# Patient Record
Sex: FEMALE | Race: BLACK OR AFRICAN AMERICAN | NOT HISPANIC OR LATINO | URBAN - METROPOLITAN AREA
[De-identification: names, ages, dates, MRNs, and addresses within clinical notes are randomized per-mention and may not be internally consistent; named-entity substitution may affect disease eponyms.]

---

## 2023-11-11 ENCOUNTER — HOSPITAL ENCOUNTER (OUTPATIENT)
Facility: HOSPITAL | Age: 63
Setting detail: OBSERVATION
Discharge: NURSING, REHAB OR LONG-TERM CARE | End: 2023-11-17
Attending: EMERGENCY MEDICINE | Admitting: NURSE PRACTITIONER
Payer: COMMERCIAL

## 2023-11-11 ENCOUNTER — APPOINTMENT (OUTPATIENT)
Dept: RADIOLOGY | Facility: HOSPITAL | Age: 63
End: 2023-11-11
Payer: COMMERCIAL

## 2023-11-11 DIAGNOSIS — S73.004A HIP DISLOCATION, RIGHT, INITIAL ENCOUNTER (MULTI): Primary | ICD-10-CM

## 2023-11-11 DIAGNOSIS — R45.1 AGITATION: ICD-10-CM

## 2023-11-11 LAB
ANION GAP SERPL CALC-SCNC: 11 MMOL/L (ref 10–20)
BASOPHILS # BLD AUTO: 0.02 X10*3/UL (ref 0–0.1)
BASOPHILS NFR BLD AUTO: 0.4 %
BUN SERPL-MCNC: 18 MG/DL (ref 6–23)
CALCIUM SERPL-MCNC: 9.1 MG/DL (ref 8.6–10.6)
CHLORIDE SERPL-SCNC: 112 MMOL/L (ref 98–107)
CO2 SERPL-SCNC: 26 MMOL/L (ref 21–32)
CREAT SERPL-MCNC: 0.93 MG/DL (ref 0.5–1.05)
EOSINOPHIL # BLD AUTO: 0.06 X10*3/UL (ref 0–0.7)
EOSINOPHIL NFR BLD AUTO: 1.3 %
ERYTHROCYTE [DISTWIDTH] IN BLOOD BY AUTOMATED COUNT: 13.6 % (ref 11.5–14.5)
GFR SERPL CREATININE-BSD FRML MDRD: 69 ML/MIN/1.73M*2
GLUCOSE SERPL-MCNC: 101 MG/DL (ref 74–99)
HCT VFR BLD AUTO: 34.6 % (ref 36–46)
HGB BLD-MCNC: 10.9 G/DL (ref 12–16)
IMM GRANULOCYTES # BLD AUTO: 0.02 X10*3/UL (ref 0–0.7)
IMM GRANULOCYTES NFR BLD AUTO: 0.4 % (ref 0–0.9)
LYMPHOCYTES # BLD AUTO: 1.24 X10*3/UL (ref 1.2–4.8)
LYMPHOCYTES NFR BLD AUTO: 26.3 %
MCH RBC QN AUTO: 27.9 PG (ref 26–34)
MCHC RBC AUTO-ENTMCNC: 31.5 G/DL (ref 32–36)
MCV RBC AUTO: 89 FL (ref 80–100)
MONOCYTES # BLD AUTO: 0.44 X10*3/UL (ref 0.1–1)
MONOCYTES NFR BLD AUTO: 9.3 %
NEUTROPHILS # BLD AUTO: 2.93 X10*3/UL (ref 1.2–7.7)
NEUTROPHILS NFR BLD AUTO: 62.3 %
NRBC BLD-RTO: 0 /100 WBCS (ref 0–0)
PLATELET # BLD AUTO: 352 X10*3/UL (ref 150–450)
POTASSIUM SERPL-SCNC: 4.1 MMOL/L (ref 3.5–5.3)
RBC # BLD AUTO: 3.9 X10*6/UL (ref 4–5.2)
SODIUM SERPL-SCNC: 145 MMOL/L (ref 136–145)
WBC # BLD AUTO: 4.7 X10*3/UL (ref 4.4–11.3)

## 2023-11-11 PROCEDURE — 2500000004 HC RX 250 GENERAL PHARMACY W/ HCPCS (ALT 636 FOR OP/ED): Mod: SE

## 2023-11-11 PROCEDURE — 85652 RBC SED RATE AUTOMATED: CPT

## 2023-11-11 PROCEDURE — 72170 X-RAY EXAM OF PELVIS: CPT | Mod: FY,FR

## 2023-11-11 PROCEDURE — 27265 TREAT HIP DISLOCATION: CPT

## 2023-11-11 PROCEDURE — 80048 BASIC METABOLIC PNL TOTAL CA: CPT | Performed by: EMERGENCY MEDICINE

## 2023-11-11 PROCEDURE — 99285 EMERGENCY DEPT VISIT HI MDM: CPT | Mod: 25 | Performed by: EMERGENCY MEDICINE

## 2023-11-11 PROCEDURE — 73502 X-RAY EXAM HIP UNI 2-3 VIEWS: CPT | Mod: RT,FR

## 2023-11-11 PROCEDURE — 85025 COMPLETE CBC W/AUTO DIFF WBC: CPT | Performed by: EMERGENCY MEDICINE

## 2023-11-11 PROCEDURE — 86140 C-REACTIVE PROTEIN: CPT

## 2023-11-11 PROCEDURE — 36415 COLL VENOUS BLD VENIPUNCTURE: CPT | Performed by: EMERGENCY MEDICINE

## 2023-11-11 PROCEDURE — 99152 MOD SED SAME PHYS/QHP 5/>YRS: CPT

## 2023-11-11 PROCEDURE — 72170 X-RAY EXAM OF PELVIS: CPT | Mod: FOREIGN READ | Performed by: RADIOLOGY

## 2023-11-11 PROCEDURE — 99156 MOD SED OTH PHYS/QHP 5/>YRS: CPT

## 2023-11-11 PROCEDURE — 73502 X-RAY EXAM HIP UNI 2-3 VIEWS: CPT | Mod: RIGHT SIDE | Performed by: RADIOLOGY

## 2023-11-11 PROCEDURE — 99285 EMERGENCY DEPT VISIT HI MDM: CPT | Mod: 25,27

## 2023-11-11 PROCEDURE — 99156 MOD SED OTH PHYS/QHP 5/>YRS: CPT | Performed by: EMERGENCY MEDICINE

## 2023-11-11 PROCEDURE — 99285 EMERGENCY DEPT VISIT HI MDM: CPT | Performed by: EMERGENCY MEDICINE

## 2023-11-11 PROCEDURE — 96374 THER/PROPH/DIAG INJ IV PUSH: CPT

## 2023-11-11 RX ORDER — HYDROCHLOROTHIAZIDE 12.5 MG/1
25 CAPSULE ORAL ONCE
Status: DISCONTINUED | OUTPATIENT
Start: 2023-11-11 | End: 2023-11-12

## 2023-11-11 RX ORDER — PROPOFOL 10 MG/ML
0.5 INJECTION, EMULSION INTRAVENOUS AS NEEDED
Status: DISCONTINUED | OUTPATIENT
Start: 2023-11-11 | End: 2023-11-12

## 2023-11-11 RX ORDER — PROPOFOL 10 MG/ML
INJECTION, EMULSION INTRAVENOUS CODE/TRAUMA/SEDATION MEDICATION
Status: COMPLETED | OUTPATIENT
Start: 2023-11-11 | End: 2023-11-11

## 2023-11-11 RX ORDER — MORPHINE SULFATE 4 MG/ML
4 INJECTION INTRAVENOUS ONCE
Status: COMPLETED | OUTPATIENT
Start: 2023-11-11 | End: 2023-11-11

## 2023-11-11 RX ORDER — AMLODIPINE BESYLATE 5 MG/1
5 TABLET ORAL ONCE
Status: COMPLETED | OUTPATIENT
Start: 2023-11-11 | End: 2023-11-12

## 2023-11-11 RX ORDER — PROPOFOL 10 MG/ML
1 INJECTION, EMULSION INTRAVENOUS ONCE
Status: DISCONTINUED | OUTPATIENT
Start: 2023-11-11 | End: 2023-11-12

## 2023-11-11 RX ORDER — PHENYLEPHRINE HCL IN 0.9% NACL 0.4MG/10ML
40 SYRINGE (ML) INTRAVENOUS ONCE AS NEEDED
Status: DISCONTINUED | OUTPATIENT
Start: 2023-11-11 | End: 2023-11-12

## 2023-11-11 RX ADMIN — PROPOFOL 25 MG: 10 INJECTION, EMULSION INTRAVENOUS at 23:47

## 2023-11-11 RX ADMIN — MORPHINE SULFATE 4 MG: 4 INJECTION INTRAVENOUS at 23:26

## 2023-11-11 RX ADMIN — PROPOFOL 50 MG: 10 INJECTION, EMULSION INTRAVENOUS at 23:45

## 2023-11-11 ASSESSMENT — PAIN DESCRIPTION - LOCATION
LOCATION: HIP
LOCATION: HIP

## 2023-11-11 ASSESSMENT — COLUMBIA-SUICIDE SEVERITY RATING SCALE - C-SSRS
2. HAVE YOU ACTUALLY HAD ANY THOUGHTS OF KILLING YOURSELF?: NO
1. IN THE PAST MONTH, HAVE YOU WISHED YOU WERE DEAD OR WISHED YOU COULD GO TO SLEEP AND NOT WAKE UP?: NO
6. HAVE YOU EVER DONE ANYTHING, STARTED TO DO ANYTHING, OR PREPARED TO DO ANYTHING TO END YOUR LIFE?: NO

## 2023-11-11 ASSESSMENT — PAIN - FUNCTIONAL ASSESSMENT
PAIN_FUNCTIONAL_ASSESSMENT: 0-10
PAIN_FUNCTIONAL_ASSESSMENT: 0-10

## 2023-11-11 ASSESSMENT — PAIN DESCRIPTION - DESCRIPTORS: DESCRIPTORS: SHARP;SHOOTING

## 2023-11-11 ASSESSMENT — PAIN SCALES - GENERAL
PAINLEVEL_OUTOF10: 10 - WORST POSSIBLE PAIN
PAINLEVEL_OUTOF10: 10 - WORST POSSIBLE PAIN

## 2023-11-11 ASSESSMENT — PAIN DESCRIPTION - PAIN TYPE: TYPE: ACUTE PAIN

## 2023-11-11 ASSESSMENT — PAIN DESCRIPTION - ORIENTATION: ORIENTATION: RIGHT

## 2023-11-12 ENCOUNTER — APPOINTMENT (OUTPATIENT)
Dept: RADIOLOGY | Facility: HOSPITAL | Age: 63
End: 2023-11-12
Payer: COMMERCIAL

## 2023-11-12 PROBLEM — S73.004A HIP DISLOCATION, RIGHT, INITIAL ENCOUNTER (MULTI): Status: ACTIVE | Noted: 2023-11-12

## 2023-11-12 LAB
CRP SERPL-MCNC: 0.74 MG/DL
ERYTHROCYTE [SEDIMENTATION RATE] IN BLOOD BY WESTERGREN METHOD: 62 MM/H (ref 0–30)
GLUCOSE BLD MANUAL STRIP-MCNC: 114 MG/DL (ref 74–99)

## 2023-11-12 PROCEDURE — 96372 THER/PROPH/DIAG INJ SC/IM: CPT | Performed by: NURSE PRACTITIONER

## 2023-11-12 PROCEDURE — G0378 HOSPITAL OBSERVATION PER HR: HCPCS

## 2023-11-12 PROCEDURE — 2500000004 HC RX 250 GENERAL PHARMACY W/ HCPCS (ALT 636 FOR OP/ED): Mod: SE

## 2023-11-12 PROCEDURE — 82947 ASSAY GLUCOSE BLOOD QUANT: CPT

## 2023-11-12 PROCEDURE — 2500000004 HC RX 250 GENERAL PHARMACY W/ HCPCS (ALT 636 FOR OP/ED): Mod: SE | Performed by: STUDENT IN AN ORGANIZED HEALTH CARE EDUCATION/TRAINING PROGRAM

## 2023-11-12 PROCEDURE — 2500000004 HC RX 250 GENERAL PHARMACY W/ HCPCS (ALT 636 FOR OP/ED): Mod: SE | Performed by: NURSE PRACTITIONER

## 2023-11-12 PROCEDURE — 2500000001 HC RX 250 WO HCPCS SELF ADMINISTERED DRUGS (ALT 637 FOR MEDICARE OP): Mod: SE

## 2023-11-12 PROCEDURE — 73502 X-RAY EXAM HIP UNI 2-3 VIEWS: CPT | Mod: 59,RT,FR

## 2023-11-12 PROCEDURE — 99223 1ST HOSP IP/OBS HIGH 75: CPT | Performed by: NURSE PRACTITIONER

## 2023-11-12 PROCEDURE — 96376 TX/PRO/DX INJ SAME DRUG ADON: CPT

## 2023-11-12 PROCEDURE — 96375 TX/PRO/DX INJ NEW DRUG ADDON: CPT

## 2023-11-12 PROCEDURE — 2500000004 HC RX 250 GENERAL PHARMACY W/ HCPCS (ALT 636 FOR OP/ED): Mod: SE | Performed by: EMERGENCY MEDICINE

## 2023-11-12 PROCEDURE — 2500000001 HC RX 250 WO HCPCS SELF ADMINISTERED DRUGS (ALT 637 FOR MEDICARE OP): Mod: SE | Performed by: STUDENT IN AN ORGANIZED HEALTH CARE EDUCATION/TRAINING PROGRAM

## 2023-11-12 PROCEDURE — 2500000001 HC RX 250 WO HCPCS SELF ADMINISTERED DRUGS (ALT 637 FOR MEDICARE OP): Mod: SE | Performed by: NURSE PRACTITIONER

## 2023-11-12 PROCEDURE — 2500000005 HC RX 250 GENERAL PHARMACY W/O HCPCS: Mod: SE

## 2023-11-12 RX ORDER — ALBUTEROL SULFATE 90 UG/1
2 AEROSOL, METERED RESPIRATORY (INHALATION) EVERY 6 HOURS PRN
COMMUNITY
Start: 2023-08-02 | End: 2024-08-01

## 2023-11-12 RX ORDER — HYDRALAZINE HYDROCHLORIDE 25 MG/1
25 TABLET, FILM COATED ORAL ONCE
Status: COMPLETED | OUTPATIENT
Start: 2023-11-12 | End: 2023-11-12

## 2023-11-12 RX ORDER — QUETIAPINE FUMARATE 25 MG/1
25 TABLET, FILM COATED ORAL 2 TIMES DAILY PRN
Status: DISCONTINUED | OUTPATIENT
Start: 2023-11-12 | End: 2023-11-17 | Stop reason: HOSPADM

## 2023-11-12 RX ORDER — CYCLOBENZAPRINE HCL 10 MG
10 TABLET ORAL 3 TIMES DAILY PRN
COMMUNITY
Start: 2023-10-11 | End: 2024-01-09

## 2023-11-12 RX ORDER — ACETAMINOPHEN 500 MG
10 TABLET ORAL NIGHTLY
Status: DISCONTINUED | OUTPATIENT
Start: 2023-11-12 | End: 2023-11-17 | Stop reason: HOSPADM

## 2023-11-12 RX ORDER — GABAPENTIN 300 MG/1
600 CAPSULE ORAL 3 TIMES DAILY
COMMUNITY
Start: 2023-02-23

## 2023-11-12 RX ORDER — LANOLIN ALCOHOL/MO/W.PET/CERES
100 CREAM (GRAM) TOPICAL DAILY
Status: DISCONTINUED | OUTPATIENT
Start: 2023-11-12 | End: 2023-11-17 | Stop reason: HOSPADM

## 2023-11-12 RX ORDER — OXYCODONE HYDROCHLORIDE 5 MG/1
5 TABLET ORAL EVERY 6 HOURS PRN
Status: DISCONTINUED | OUTPATIENT
Start: 2023-11-12 | End: 2023-11-14

## 2023-11-12 RX ORDER — LEVETIRACETAM 750 MG/1
750 TABLET ORAL 2 TIMES DAILY
COMMUNITY
Start: 2021-01-16

## 2023-11-12 RX ORDER — AMOXICILLIN 250 MG
2 CAPSULE ORAL 2 TIMES DAILY
Status: DISCONTINUED | OUTPATIENT
Start: 2023-11-12 | End: 2023-11-17 | Stop reason: HOSPADM

## 2023-11-12 RX ORDER — HYDRALAZINE HYDROCHLORIDE 20 MG/ML
5 INJECTION INTRAMUSCULAR; INTRAVENOUS ONCE
Status: COMPLETED | OUTPATIENT
Start: 2023-11-12 | End: 2023-11-12

## 2023-11-12 RX ORDER — CHOLECALCIFEROL (VITAMIN D3) 50 MCG
2000 TABLET ORAL
COMMUNITY
Start: 2023-08-02

## 2023-11-12 RX ORDER — HYDROCHLOROTHIAZIDE 25 MG/1
25 TABLET ORAL
COMMUNITY
Start: 2023-02-23

## 2023-11-12 RX ORDER — MORPHINE SULFATE 4 MG/ML
INJECTION INTRAVENOUS
Status: COMPLETED
Start: 2023-11-12 | End: 2023-11-12

## 2023-11-12 RX ORDER — LEVETIRACETAM 500 MG/1
750 TABLET ORAL 2 TIMES DAILY
Status: DISCONTINUED | OUTPATIENT
Start: 2023-11-12 | End: 2023-11-17 | Stop reason: HOSPADM

## 2023-11-12 RX ORDER — ENOXAPARIN SODIUM 100 MG/ML
40 INJECTION SUBCUTANEOUS EVERY 24 HOURS
Status: DISCONTINUED | OUTPATIENT
Start: 2023-11-12 | End: 2023-11-17 | Stop reason: HOSPADM

## 2023-11-12 RX ORDER — MORPHINE SULFATE 4 MG/ML
4 INJECTION INTRAVENOUS ONCE
Status: COMPLETED | OUTPATIENT
Start: 2023-11-12 | End: 2023-11-12

## 2023-11-12 RX ORDER — HYDROCHLOROTHIAZIDE 25 MG/1
25 TABLET ORAL ONCE
Status: COMPLETED | OUTPATIENT
Start: 2023-11-12 | End: 2023-11-12

## 2023-11-12 RX ORDER — CHOLECALCIFEROL (VITAMIN D3) 25 MCG
2000 TABLET ORAL DAILY
Status: DISCONTINUED | OUTPATIENT
Start: 2023-11-12 | End: 2023-11-17 | Stop reason: HOSPADM

## 2023-11-12 RX ORDER — LEVETIRACETAM 750 MG/1
1500 TABLET, EXTENDED RELEASE ORAL DAILY
Status: DISCONTINUED | OUTPATIENT
Start: 2023-11-12 | End: 2023-11-12

## 2023-11-12 RX ORDER — HYDROCHLOROTHIAZIDE 25 MG/1
25 TABLET ORAL DAILY
Status: DISCONTINUED | OUTPATIENT
Start: 2023-11-12 | End: 2023-11-17 | Stop reason: HOSPADM

## 2023-11-12 RX ORDER — LANOLIN ALCOHOL/MO/W.PET/CERES
100 CREAM (GRAM) TOPICAL DAILY
COMMUNITY
Start: 2023-08-02

## 2023-11-12 RX ORDER — GABAPENTIN 300 MG/1
600 CAPSULE ORAL 3 TIMES DAILY
Status: DISCONTINUED | OUTPATIENT
Start: 2023-11-12 | End: 2023-11-14

## 2023-11-12 RX ORDER — HYDRALAZINE HYDROCHLORIDE 25 MG/1
25 TABLET, FILM COATED ORAL EVERY 8 HOURS PRN
Status: DISCONTINUED | OUTPATIENT
Start: 2023-11-12 | End: 2023-11-17 | Stop reason: HOSPADM

## 2023-11-12 RX ORDER — ACETAMINOPHEN 325 MG/1
975 TABLET ORAL EVERY 8 HOURS
Status: DISCONTINUED | OUTPATIENT
Start: 2023-11-12 | End: 2023-11-17 | Stop reason: HOSPADM

## 2023-11-12 RX ORDER — PROPOFOL 10 MG/ML
75 INJECTION, EMULSION INTRAVENOUS ONCE
Status: COMPLETED | OUTPATIENT
Start: 2023-11-12 | End: 2023-11-12

## 2023-11-12 RX ORDER — LABETALOL HYDROCHLORIDE 5 MG/ML
20 INJECTION, SOLUTION INTRAVENOUS ONCE
Status: COMPLETED | OUTPATIENT
Start: 2023-11-12 | End: 2023-11-12

## 2023-11-12 RX ORDER — CYCLOBENZAPRINE HCL 10 MG
10 TABLET ORAL 3 TIMES DAILY PRN
Status: DISCONTINUED | OUTPATIENT
Start: 2023-11-12 | End: 2023-11-17 | Stop reason: HOSPADM

## 2023-11-12 RX ADMIN — MORPHINE SULFATE 4 MG: 4 INJECTION INTRAVENOUS at 14:12

## 2023-11-12 RX ADMIN — Medication 10 MG: at 23:41

## 2023-11-12 RX ADMIN — HYDRALAZINE HYDROCHLORIDE 5 MG: 20 INJECTION INTRAMUSCULAR; INTRAVENOUS at 23:36

## 2023-11-12 RX ADMIN — PROPOFOL 75 MG: 10 INJECTION, EMULSION INTRAVENOUS at 02:06

## 2023-11-12 RX ADMIN — Medication 10 MG: at 23:16

## 2023-11-12 RX ADMIN — AMLODIPINE BESYLATE 5 MG: 5 TABLET ORAL at 20:46

## 2023-11-12 RX ADMIN — LABETALOL HYDROCHLORIDE 20 MG: 5 INJECTION, SOLUTION INTRAVENOUS at 06:57

## 2023-11-12 RX ADMIN — ENOXAPARIN SODIUM 40 MG: 100 INJECTION SUBCUTANEOUS at 19:56

## 2023-11-12 RX ADMIN — HYDRALAZINE HYDROCHLORIDE 5 MG: 20 INJECTION, SOLUTION INTRAMUSCULAR; INTRAVENOUS at 21:54

## 2023-11-12 RX ADMIN — HYDROCHLOROTHIAZIDE 25 MG: 25 TABLET ORAL at 03:34

## 2023-11-12 RX ADMIN — HYDROCHLOROTHIAZIDE 25 MG: 25 TABLET ORAL at 19:57

## 2023-11-12 RX ADMIN — GABAPENTIN 600 MG: 300 CAPSULE ORAL at 23:42

## 2023-11-12 RX ADMIN — LEVETIRACETAM 1500 MG: 750 TABLET, FILM COATED, EXTENDED RELEASE ORAL at 16:47

## 2023-11-12 RX ADMIN — QUETIAPINE FUMARATE 25 MG: 25 TABLET ORAL at 23:41

## 2023-11-12 RX ADMIN — OXYCODONE HYDROCHLORIDE 5 MG: 5 TABLET ORAL at 19:57

## 2023-11-12 RX ADMIN — HYDRALAZINE HYDROCHLORIDE 25 MG: 25 TABLET, FILM COATED ORAL at 20:52

## 2023-11-12 SDOH — SOCIAL STABILITY: SOCIAL INSECURITY: HAVE YOU HAD THOUGHTS OF HARMING ANYONE ELSE?: YES

## 2023-11-12 SDOH — SOCIAL STABILITY: SOCIAL INSECURITY: ARE THERE ANY APPARENT SIGNS OF INJURIES/BEHAVIORS THAT COULD BE RELATED TO ABUSE/NEGLECT?: NO

## 2023-11-12 SDOH — SOCIAL STABILITY: SOCIAL INSECURITY: HAS ANYONE EVER THREATENED TO HURT YOUR FAMILY OR YOUR PETS?: NO

## 2023-11-12 SDOH — SOCIAL STABILITY: SOCIAL INSECURITY: DOES ANYONE TRY TO KEEP YOU FROM HAVING/CONTACTING OTHER FRIENDS OR DOING THINGS OUTSIDE YOUR HOME?: NO

## 2023-11-12 SDOH — SOCIAL STABILITY: SOCIAL INSECURITY: ABUSE: ADULT

## 2023-11-12 SDOH — SOCIAL STABILITY: SOCIAL INSECURITY: ARE YOU OR HAVE YOU BEEN THREATENED OR ABUSED PHYSICALLY, EMOTIONALLY, OR SEXUALLY BY ANYONE?: NO

## 2023-11-12 SDOH — SOCIAL STABILITY: SOCIAL INSECURITY: DO YOU FEEL UNSAFE GOING BACK TO THE PLACE WHERE YOU ARE LIVING?: NO

## 2023-11-12 SDOH — SOCIAL STABILITY: SOCIAL INSECURITY: DO YOU FEEL ANYONE HAS EXPLOITED OR TAKEN ADVANTAGE OF YOU FINANCIALLY OR OF YOUR PERSONAL PROPERTY?: NO

## 2023-11-12 ASSESSMENT — LIFESTYLE VARIABLES
EVER HAD A DRINK FIRST THING IN THE MORNING TO STEADY YOUR NERVES TO GET RID OF A HANGOVER: NO
HOW OFTEN DO YOU HAVE 6 OR MORE DRINKS ON ONE OCCASION: NEVER
AUDIT-C TOTAL SCORE: 0
SKIP TO QUESTIONS 9-10: 1
REASON UNABLE TO ASSESS: NO
HAVE PEOPLE ANNOYED YOU BY CRITICIZING YOUR DRINKING: NO
HOW MANY STANDARD DRINKS CONTAINING ALCOHOL DO YOU HAVE ON A TYPICAL DAY: PATIENT DOES NOT DRINK
HAVE YOU EVER FELT YOU SHOULD CUT DOWN ON YOUR DRINKING: NO
AUDIT-C TOTAL SCORE: 0
EVER FELT BAD OR GUILTY ABOUT YOUR DRINKING: NO
HOW OFTEN DO YOU HAVE A DRINK CONTAINING ALCOHOL: NEVER

## 2023-11-12 ASSESSMENT — ACTIVITIES OF DAILY LIVING (ADL)
GROOMING: INDEPENDENT
TOILETING: NEEDS ASSISTANCE
PATIENT'S MEMORY ADEQUATE TO SAFELY COMPLETE DAILY ACTIVITIES?: YES
WALKS IN HOME: NEEDS ASSISTANCE
HEARING - LEFT EAR: FUNCTIONAL
BATHING: NEEDS ASSISTANCE
DRESSING YOURSELF: NEEDS ASSISTANCE
JUDGMENT_ADEQUATE_SAFELY_COMPLETE_DAILY_ACTIVITIES: YES
FEEDING YOURSELF: INDEPENDENT
ADEQUATE_TO_COMPLETE_ADL: YES
HEARING - RIGHT EAR: FUNCTIONAL

## 2023-11-12 ASSESSMENT — ENCOUNTER SYMPTOMS
MUSCULOSKELETAL NEGATIVE: 1
CONSTITUTIONAL NEGATIVE: 1
EYES NEGATIVE: 1
RESPIRATORY NEGATIVE: 1
GASTROINTESTINAL NEGATIVE: 1
CARDIOVASCULAR NEGATIVE: 1
NEUROLOGICAL NEGATIVE: 1
WHEEZING: 0

## 2023-11-12 ASSESSMENT — COGNITIVE AND FUNCTIONAL STATUS - GENERAL
PERSONAL GROOMING: A LOT
STANDING UP FROM CHAIR USING ARMS: TOTAL
DRESSING REGULAR UPPER BODY CLOTHING: TOTAL
WALKING IN HOSPITAL ROOM: TOTAL
DAILY ACTIVITIY SCORE: 10
DRESSING REGULAR LOWER BODY CLOTHING: TOTAL
MOVING TO AND FROM BED TO CHAIR: A LITTLE
MOVING FROM LYING ON BACK TO SITTING ON SIDE OF FLAT BED WITH BEDRAILS: A LITTLE
TOILETING: TOTAL
HELP NEEDED FOR BATHING: TOTAL
PATIENT BASELINE BEDBOUND: NO
CLIMB 3 TO 5 STEPS WITH RAILING: TOTAL
MOBILITY SCORE: 12
TURNING FROM BACK TO SIDE WHILE IN FLAT BAD: A LITTLE

## 2023-11-12 ASSESSMENT — PATIENT HEALTH QUESTIONNAIRE - PHQ9
SUM OF ALL RESPONSES TO PHQ9 QUESTIONS 1 & 2: 0
2. FEELING DOWN, DEPRESSED OR HOPELESS: NOT AT ALL
1. LITTLE INTEREST OR PLEASURE IN DOING THINGS: NOT AT ALL

## 2023-11-12 ASSESSMENT — PAIN SCALES - GENERAL
PAINLEVEL_OUTOF10: 8
PAINLEVEL_OUTOF10: 0 - NO PAIN
PAINLEVEL_OUTOF10: 10 - WORST POSSIBLE PAIN

## 2023-11-12 ASSESSMENT — PAIN - FUNCTIONAL ASSESSMENT
PAIN_FUNCTIONAL_ASSESSMENT: 0-10
PAIN_FUNCTIONAL_ASSESSMENT: 0-10

## 2023-11-12 NOTE — CONSULTS
Orthopaedic Surgery Consult H&P      Requesting Provider / Service:  ED    CC: R prosthetic hip dislocation    HPI: 63 y.o. female (Epilepsy on meds, prior cocaine use, alcohol abuse, s/p b/l KAMILLE at [facility] in 2009 w/out records, prior frequent R KAMILLE dislocations last in 2021) presents with above after bending over in a chair. Pt reports never following up with a surgeon for her dislocations. Denies any N/T in RLE.     Patient ambulates without assistance at baseline.    PMH:  History reviewed. No pertinent past medical history.    History reviewed. No pertinent surgical history.    Social History     Socioeconomic History    Marital status: Single     Spouse name: Not on file    Number of children: Not on file    Years of education: Not on file    Highest education level: Not on file   Occupational History    Not on file   Tobacco Use    Smoking status: Not on file    Smokeless tobacco: Not on file   Substance and Sexual Activity    Alcohol use: Not on file    Drug use: Not on file    Sexual activity: Not on file   Other Topics Concern    Not on file   Social History Narrative    Not on file     Social Determinants of Health     Financial Resource Strain: Not on file   Food Insecurity: Not on file   Transportation Needs: Not on file   Physical Activity: Not on file   Stress: Not on file   Social Connections: Not on file   Intimate Partner Violence: Not on file   Housing Stability: Not on file       Allergies   Allergen Reactions    Hydromorphone Unknown    Meperidine Unknown     Uncertain of reaction.  Had it during surgery?    Tetracycline Unknown         Current Facility-Administered Medications:     amLODIPine (Norvasc) tablet 5 mg, 5 mg, oral, Once, Claude Harp MD    hydroCHLOROthiazide (Microzide) capsule 25 mg, 25 mg, oral, Once, Claude Harp MD    oxygen (O2) therapy, , inhalation, Continuous, Claude Harp MD  No current outpatient medications on file.    Family History: non-contributory      Review of  "Systems:   ROS  No pertinent symptoms related to systems other than those stated above      O:  @24HRVITALS@  No intake or output data in the 24 hours ending 11/12/23 0234    Physical Exam:   BP (!) 171/117   Pulse 55   Temp 36.9 °C (98.5 °F) (Temporal)   Resp 11   Ht 1.6 m (5' 3\")   Wt 52.6 kg (116 lb)   SpO2 100%   BMI 20.55 kg/m²     Constitutional: NAD  HEENT: hearing and vision grossly intact, MMM  Resp: breathing comfortably on RA  CV: extremities warm, well perfused  Neuro: alert, sensory and motor grossly intact  Psych: Appropriate mood and affect    MSK:  Right Lower Extremity:   -Skin intact  - RLE shorter than LLE  -Tender at site of injury with painful ROM.  -Fires DF/PF/EHL/FHL  -SILT in saph/sural/SPN/DPN distributions  -Foot warm, well perfused  -Palpable DP pulse, brisk cap refill  -Compartments soft and compressible      Relevant Results  Results for orders placed or performed during the hospital encounter of 11/11/23 (from the past 24 hour(s))   CBC and Auto Differential   Result Value Ref Range    WBC 4.7 4.4 - 11.3 x10*3/uL    nRBC 0.0 0.0 - 0.0 /100 WBCs    RBC 3.90 (L) 4.00 - 5.20 x10*6/uL    Hemoglobin 10.9 (L) 12.0 - 16.0 g/dL    Hematocrit 34.6 (L) 36.0 - 46.0 %    MCV 89 80 - 100 fL    MCH 27.9 26.0 - 34.0 pg    MCHC 31.5 (L) 32.0 - 36.0 g/dL    RDW 13.6 11.5 - 14.5 %    Platelets 352 150 - 450 x10*3/uL    Neutrophils % 62.3 40.0 - 80.0 %    Immature Granulocytes %, Automated 0.4 0.0 - 0.9 %    Lymphocytes % 26.3 13.0 - 44.0 %    Monocytes % 9.3 2.0 - 10.0 %    Eosinophils % 1.3 0.0 - 6.0 %    Basophils % 0.4 0.0 - 2.0 %    Neutrophils Absolute 2.93 1.20 - 7.70 x10*3/uL    Immature Granulocytes Absolute, Automated 0.02 0.00 - 0.70 x10*3/uL    Lymphocytes Absolute 1.24 1.20 - 4.80 x10*3/uL    Monocytes Absolute 0.44 0.10 - 1.00 x10*3/uL    Eosinophils Absolute 0.06 0.00 - 0.70 x10*3/uL    Basophils Absolute 0.02 0.00 - 0.10 x10*3/uL   Basic metabolic panel   Result Value Ref Range    " Glucose 101 (H) 74 - 99 mg/dL    Sodium 145 136 - 145 mmol/L    Potassium 4.1 3.5 - 5.3 mmol/L    Chloride 112 (H) 98 - 107 mmol/L    Bicarbonate 26 21 - 32 mmol/L    Anion Gap 11 10 - 20 mmol/L    Urea Nitrogen 18 6 - 23 mg/dL    Creatinine 0.93 0.50 - 1.05 mg/dL    eGFR 69 >60 mL/min/1.73m*2    Calcium 9.1 8.6 - 10.6 mg/dL       XR hip right 2 or 3 views    Result Date: 11/12/2023  STUDY: Hip Radiographs; 11/12/2023 12:42 AM INDICATION: Post reduction. COMPARISON: 11/12/2023 XR Right Hip and Pelvis. ACCESSION NUMBER(S): [id number] ORDERING CLINICIAN: YUMI HARP TECHNIQUE:  Three view(s) of the right hip. FINDINGS:  The total hip arthroplasty is in anatomic alignment.  There is no displaced fracture.  There is no evidence of orthopedic hardware complications.  No soft tissue abnormality is seen.    No acute osseous abnormality identified.  No evidence of hardware failure or complication. Signed by Ashok Zacarias    XR pelvis 1-2 views    Result Date: 11/12/2023  STUDY: Pelvis Radiographs; 11/11/2023 11:56 PM INDICATION: Dislocation. COMPARISON: 11/11/2023 XR Right Hip and Pelvis. ACCESSION NUMBER(S): [id number] ORDERING CLINICIAN: OLIVIA BARRIENTOS TECHNIQUE:  One view(s) of the pelvis. FINDINGS:  The pelvic ring is intact.  There is no acute fracture.  Bilateral total hip arthroplasties are noted with normal alignment.    No definite acute pelvic pathology identified.  Normal alignment. Signed by Ashok Zacarias    XR hip right 2 or 3 views    Result Date: 11/11/2023  STUDY: Pelvis and Right Hip Radiographs; 11/11/2023 10:11PM INDICATION: Concern for dislocation. Evaluate for fracture. COMPARISON: 6/1/2023 X Hp Right. ACCESSION NUMBER(S): [id number] ORDERING CLINICIAN: Yumi Harp TECHNIQUE:  AP view of the pelvis and two view(s) of the right hip. FINDINGS:  PELVIS: No displaced fracture is evident. RIGHT HIP: Superior dislocation of the right femoral head component relative to the acetabular cup.  No  periprosthetic fracture is evident.    Right hip arthroplasty dislocation without periprosthetic fracture. Signed by Channing Springer DO         A/P: 63 y.o. female w/ prior frequent R prosthetic hip dislocations present with recurrent R prosthetic hip dislocation. Closed, NVI.  CR under conscious sedation, placed in a well padded knee immobilizer. Patient will ultimately need R KAMILLE revision with total joints team.     - No acute orthopedic intervention  - Recommend CDU admit overnight for eval by orthotics in AM for hip abduction brace  - WB: WBAT RLE in KI w/ posterior hip precautions until placed in hip abduction brace    - Dispo: Pending brace fitting in AM with orthotics, final follow up TBD w/ ortho joints for discussion of revision KAMILLE    To be discussed with attending on call, Dr. Ross.    Please do not hesitate to page with additional questions or concerns.    ------------------------------------------------------    Vinh Garcia MD  Orthopaedic Surgery, PGY-2  Available by Epic Chat  11/12/23  2:34 AM    This patient will be followed by Ortho Trauma team (All chat preferred):    1st call: Milind Luna, PGY-2  2nd call: Royal Vasquez, PGY-3    On weekends and after 6PM:  At [facility]: Please reach out to the orthopaedic on-call resident ([phone])  At [facility]: Please reach out to the orthopaedic on-call ARLEN or resident (please refer to Jovan)    After 5 pm and on weekends, please page the on-call resident ([phone]) with questions or concerns.      11/12/23  This consult was seen and staffed within 30 minutes of the initial consult.

## 2023-11-12 NOTE — ED TRIAGE NOTES
Patient brought in by EMS after she was walking in her home and felt her R hip pop out of place. Patient has a hx of seizures which she takes keppra for. Patients house was also found to have bed bugs.

## 2023-11-12 NOTE — PROGRESS NOTES
I assumed care of this patient at 700 hours.    Please see initial provider note for full HPI.  Briefly this is a 63-year-old female with past medical history of seizure, hypertension, crack cocaine and alcohol use disorder, asthma, migraines presenting due to right hip dislocation.  Patient had a prosthetic hip that was replaced by Ortho.  Orthotics was consulted to put the patient in a right hip abduction brace.  Patient will need eventual revision of the right hip.  Patient was told to not flex past 90 degrees and is unable to perform ADLs given posterior hip precautions.  For this reason patient was admitted to us for PT, OT and placement at SNF facility until she is able to get definitive management.  Patient care was overseen by attending physician agrees with the plan disposition.    Sue Gaitan MD  Emergency Medicine - PGY2  [facility]  [address] [facility] [id number]

## 2023-11-12 NOTE — H&P
"HPI   Ms. Pickering is a 63-year-old female with PMHx HTN, seizures, cocaine abuse, asthma, migraines, hip dislocation frequent, chronic hip pain patient presents today to the ED for complaint of right hip dislocation denies any falls or injuries.  Ortho was down to see patient in the ED and ordered an orthotic hip brace for her patient is currently wearing brace on right hip.  They also reduced it in the ED patient to be admitted for PT OT eval and treat and possible SNF placement.     Past Medical History  Past Medical History:   Diagnosis Date    Arthritis     Asthma     Chronic hip pain     Cocaine abuse (CMS/HCC)     Hypertension     Migraine     Seizure (CMS/HCC)        Surgical History  Past Surgical History:   Procedure Laterality Date    JOINT REPLACEMENT          Social History  She reports that she has quit smoking. Her smoking use included cigarettes. She has a 7.50 pack-year smoking history. She has never used smokeless tobacco. She reports current drug use. Drugs: \"Crack\" cocaine, Cocaine, and Marijuana. She reports that she does not drink alcohol.    Family History  Family History   Problem Relation Name Age of Onset    Hypertension Mother          Allergies  Hydromorphone, Meperidine, and Tetracycline  ROS/EXAM   Review of Systems   Constitutional: Negative.    HENT: Negative.     Eyes: Negative.    Respiratory: Negative.  Negative for wheezing.    Cardiovascular: Negative.    Gastrointestinal: Negative.    Genitourinary: Negative.    Musculoskeletal: Negative.    Skin: Negative.    Neurological: Negative.    All other systems reviewed and are negative.       Physical Exam  Vitals reviewed.   Constitutional:       Comments: Thin frail   HENT:      Head: Normocephalic.      Nose: Nose normal.      Mouth/Throat:      Mouth: Mucous membranes are moist.      Pharynx: Oropharynx is clear.   Eyes:      Conjunctiva/sclera: Conjunctivae normal.      Pupils: Pupils are equal, round, and reactive to light. " "  Cardiovascular:      Rate and Rhythm: Normal rate and regular rhythm.      Pulses: Normal pulses.      Heart sounds: Normal heart sounds.   Pulmonary:      Effort: Pulmonary effort is normal.      Breath sounds: Normal breath sounds.   Abdominal:      General: Abdomen is flat. Bowel sounds are normal.      Palpations: Abdomen is soft.   Musculoskeletal:         General: Tenderness present.      Cervical back: Normal range of motion.      Comments: Right hip brace in place   Skin:     General: Skin is warm and dry.      Capillary Refill: Capillary refill takes less than 2 seconds.   Neurological:      Mental Status: She is alert and oriented to person, place, and time. Mental status is at baseline.   Psychiatric:         Mood and Affect: Mood normal.         Behavior: Behavior normal.         Thought Content: Thought content normal.         Judgment: Judgment normal.       Vitals/LABS/RESULTS   Last Recorded Vitals  Blood pressure (!) 205/114, pulse 70, temperature 36.3 °C (97.3 °F), resp. rate 20, height 1.6 m (5' 3\"), weight 52.6 kg (116 lb), SpO2 97 %.  Intake/Output last 3 Shifts:  No intake/output data recorded.    Relevant Results  Lab Results   Component Value Date    WBC 4.7 11/11/2023    HGB 10.9 (L) 11/11/2023    HCT 34.6 (L) 11/11/2023    MCV 89 11/11/2023     11/11/2023      Lab Results   Component Value Date    GLUCOSE 101 (H) 11/11/2023    CALCIUM 9.1 11/11/2023     11/11/2023    K 4.1 11/11/2023    CO2 26 11/11/2023     (H) 11/11/2023    BUN 18 11/11/2023    CREATININE 0.93 11/11/2023     XR hip right 2 or 3 views    Result Date: 11/12/2023  STUDY: Hip Radiographs; 11/12/2023 12:42 AM INDICATION: Post reduction. COMPARISON: 11/12/2023 XR Right Hip and Pelvis. ACCESSION NUMBER(S): [id number] ORDERING CLINICIAN: YUMI TAVERAS TECHNIQUE:  Three view(s) of the right hip. FINDINGS:  The total hip arthroplasty is in anatomic alignment.  There is no displaced fracture.  There is no " evidence of orthopedic hardware complications.  No soft tissue abnormality is seen.    No acute osseous abnormality identified.  No evidence of hardware failure or complication. Signed by Ashok Zacarias    XR pelvis 1-2 views    Result Date: 11/12/2023  STUDY: Pelvis Radiographs; 11/11/2023 11:56 PM INDICATION: Dislocation. COMPARISON: 11/11/2023 XR Right Hip and Pelvis. ACCESSION NUMBER(S): [id number] ORDERING CLINICIAN: OLIVIA BARRIENTOS TECHNIQUE:  One view(s) of the pelvis. FINDINGS:  The pelvic ring is intact.  There is no acute fracture.  Bilateral total hip arthroplasties are noted with normal alignment.    No definite acute pelvic pathology identified.  Normal alignment. Signed by Ashok Zacarias    XR hip right 2 or 3 views    Result Date: 11/11/2023  STUDY: Pelvis and Right Hip Radiographs; 11/11/2023 10:11PM INDICATION: Concern for dislocation. Evaluate for fracture. COMPARISON: 6/1/2023 X Hp Right. ACCESSION NUMBER(S): [id number] ORDERING CLINICIAN: Claude Harp TECHNIQUE:  AP view of the pelvis and two view(s) of the right hip. FINDINGS:  PELVIS: No displaced fracture is evident. RIGHT HIP: Superior dislocation of the right femoral head component relative to the acetabular cup.  No periprosthetic fracture is evident.    Right hip arthroplasty dislocation without periprosthetic fracture. Signed by Channing Springer DO  Medications   Scheduled medications  acetaminophen, 975 mg, oral, q8h  amLODIPine, 5 mg, oral, Once  cholecalciferol, 2,000 Units, oral, Daily  enoxaparin, 40 mg, subcutaneous, q24h  gabapentin, 600 mg, oral, TID  hydroCHLOROthiazide, 25 mg, oral, Daily  levETIRAcetam, 750 mg, oral, BID  sennosides-docusate sodium, 2 tablet, oral, BID  thiamine, 100 mg, oral, Daily      Continuous medications  oxygen, , Last Rate: Stopped (11/12/23 1200)      PRN medications  PRN medications: cyclobenzaprine, oxyCODONE    PLAN   Ms. Pickering is a 63-year-old female with PMHx HTN, seizures, cocaine abuse, asthma,  migraines, hip dislocation frequent, chronic hip pain patient presents today to the ED for complaint of right hip dislocation denies any falls or injuries.  Ortho was down to see patient in the ED and ordered an orthotic hip brace for her patient is currently wearing brace on right hip.  They also reduced it in the ED patient to be admitted for PT OT eval and treat and possible SNF placement.  Assessment/Plan    Right hip dislocation  Chronic hip pain  -Ortho consult and appreciate recs  -Right hip orthotic brace per Ortho  -PT OT eval and treat  -Pain acetaminophen scheduled and as needed oxy  -Flexeril as needed  -Continue home gabapentin 600 3 times daily    HTN  -Continue home hydrochlorothiazide 25 mg daily    Seizures  -New home Keppra 750 mg twice daily    Fluids: monitor and replete as needed  Electrolytes: monitor and replete as needed  Nutrition: Regular diet   GI prophylaxis: as needed  DVT prophylaxis: SCD  Code Status: full Code      Disposition:  Admitted for above diagnoses. Plan per above. Anticipate observation stay      Akanksha Lea, APRN-CNP        Assessment/Plan   Principal Problem:    Hip dislocation, right, initial encounter (CMS/[facility])      I spent 75 minutes in the professional and overall care of this patient.

## 2023-11-12 NOTE — SIGNIFICANT EVENT
Ortho Care Plan:    Paged orthotics for a R hip abduction brace prior to DC. After final discussion with attending, plan for patient to follow up Faith for discussion of revision R KAMILLE. If unable to schedule/follow-up at [facility],  [facility] available for outpatient consultation.     D/w attending, Dr. Ross.    Vinh Garcia MD  Orthopaedic Surgery, PGY-2  Available by Epic Chat  11/12/23  7:36 AM

## 2023-11-13 PROCEDURE — G0378 HOSPITAL OBSERVATION PER HR: HCPCS

## 2023-11-13 PROCEDURE — 2500000001 HC RX 250 WO HCPCS SELF ADMINISTERED DRUGS (ALT 637 FOR MEDICARE OP): Mod: SE | Performed by: STUDENT IN AN ORGANIZED HEALTH CARE EDUCATION/TRAINING PROGRAM

## 2023-11-13 PROCEDURE — 2500000001 HC RX 250 WO HCPCS SELF ADMINISTERED DRUGS (ALT 637 FOR MEDICARE OP): Mod: SE | Performed by: NURSE PRACTITIONER

## 2023-11-13 PROCEDURE — 2500000004 HC RX 250 GENERAL PHARMACY W/ HCPCS (ALT 636 FOR OP/ED): Mod: SE | Performed by: NURSE PRACTITIONER

## 2023-11-13 PROCEDURE — 97167 OT EVAL HIGH COMPLEX 60 MIN: CPT | Mod: GO | Performed by: OCCUPATIONAL THERAPIST

## 2023-11-13 PROCEDURE — 97161 PT EVAL LOW COMPLEX 20 MIN: CPT | Mod: GP

## 2023-11-13 PROCEDURE — 99233 SBSQ HOSP IP/OBS HIGH 50: CPT | Performed by: STUDENT IN AN ORGANIZED HEALTH CARE EDUCATION/TRAINING PROGRAM

## 2023-11-13 PROCEDURE — 96372 THER/PROPH/DIAG INJ SC/IM: CPT | Performed by: NURSE PRACTITIONER

## 2023-11-13 RX ADMIN — SENNOSIDES AND DOCUSATE SODIUM 2 TABLET: 8.6; 5 TABLET ORAL at 09:31

## 2023-11-13 RX ADMIN — LEVETIRACETAM 750 MG: 500 TABLET, FILM COATED ORAL at 09:31

## 2023-11-13 RX ADMIN — ACETAMINOPHEN 975 MG: 325 TABLET ORAL at 09:31

## 2023-11-13 RX ADMIN — THIAMINE HCL TAB 100 MG 100 MG: 100 TAB at 09:32

## 2023-11-13 RX ADMIN — GABAPENTIN 600 MG: 300 CAPSULE ORAL at 16:56

## 2023-11-13 RX ADMIN — GABAPENTIN 600 MG: 300 CAPSULE ORAL at 09:32

## 2023-11-13 RX ADMIN — HYDROCHLOROTHIAZIDE 25 MG: 25 TABLET ORAL at 09:32

## 2023-11-13 RX ADMIN — ACETAMINOPHEN 975 MG: 325 TABLET ORAL at 16:55

## 2023-11-13 RX ADMIN — CYCLOBENZAPRINE 10 MG: 10 TABLET, FILM COATED ORAL at 20:04

## 2023-11-13 RX ADMIN — LEVETIRACETAM 750 MG: 500 TABLET, FILM COATED ORAL at 20:03

## 2023-11-13 RX ADMIN — Medication 2000 UNITS: at 09:32

## 2023-11-13 RX ADMIN — ENOXAPARIN SODIUM 40 MG: 100 INJECTION SUBCUTANEOUS at 16:56

## 2023-11-13 RX ADMIN — OXYCODONE HYDROCHLORIDE 5 MG: 5 TABLET ORAL at 20:04

## 2023-11-13 RX ADMIN — Medication 10 MG: at 20:03

## 2023-11-13 RX ADMIN — GABAPENTIN 600 MG: 300 CAPSULE ORAL at 20:03

## 2023-11-13 RX ADMIN — SENNOSIDES AND DOCUSATE SODIUM 2 TABLET: 8.6; 5 TABLET ORAL at 20:06

## 2023-11-13 ASSESSMENT — COGNITIVE AND FUNCTIONAL STATUS - GENERAL
MOBILITY SCORE: 17
HELP NEEDED FOR BATHING: A LITTLE
DAILY ACTIVITIY SCORE: 22
TURNING FROM BACK TO SIDE WHILE IN FLAT BAD: A LOT
DRESSING REGULAR LOWER BODY CLOTHING: A LOT
MOVING FROM LYING ON BACK TO SITTING ON SIDE OF FLAT BED WITH BEDRAILS: A LOT
WALKING IN HOSPITAL ROOM: A LOT
TURNING FROM BACK TO SIDE WHILE IN FLAT BAD: A LITTLE
MOVING TO AND FROM BED TO CHAIR: A LOT
CLIMB 3 TO 5 STEPS WITH RAILING: A LOT
PERSONAL GROOMING: A LOT
TOILETING: A LITTLE
HELP NEEDED FOR BATHING: A LOT
WALKING IN HOSPITAL ROOM: TOTAL
MOVING TO AND FROM BED TO CHAIR: A LITTLE
TOILETING: TOTAL
STANDING UP FROM CHAIR USING ARMS: A LOT
STANDING UP FROM CHAIR USING ARMS: A LITTLE
DAILY ACTIVITIY SCORE: 13
CLIMB 3 TO 5 STEPS WITH RAILING: TOTAL
MOBILITY SCORE: 10
DRESSING REGULAR UPPER BODY CLOTHING: A LOT

## 2023-11-13 ASSESSMENT — PAIN - FUNCTIONAL ASSESSMENT
PAIN_FUNCTIONAL_ASSESSMENT: 0-10

## 2023-11-13 ASSESSMENT — PAIN SCALES - GENERAL
PAINLEVEL_OUTOF10: 6
PAINLEVEL_OUTOF10: 0 - NO PAIN
PAINLEVEL_OUTOF10: 8

## 2023-11-13 ASSESSMENT — ACTIVITIES OF DAILY LIVING (ADL)
ADL_ASSISTANCE: INDEPENDENT
BATHING_ASSISTANCE: MAXIMAL
ADL_ASSISTANCE: INDEPENDENT
LACK_OF_TRANSPORTATION: NO
LACK_OF_TRANSPORTATION: YES

## 2023-11-13 ASSESSMENT — PAIN DESCRIPTION - ORIENTATION: ORIENTATION: RIGHT

## 2023-11-13 ASSESSMENT — PAIN DESCRIPTION - LOCATION: LOCATION: ARM

## 2023-11-13 NOTE — PROGRESS NOTES
Occupational Therapy    Evaluation    Patient Name: Sonido Pickering  MRN: [id number]  Today's Date: 11/13/2023  Time Calculation  Start Time: 1136  Stop Time: 1151  Time Calculation (min): 15 min    Assessment  IP OT Assessment  OT Assessment: Patient is a 63 year old female admitted to hospital [facility] right hip dislocation. She presents with impaired ADL, UE function, cognition, activity tolerance, and bed mobility. Anticipate impaired functional transfers and functional mobilty as well. Patient would benefit from skilled OT services while in hospital and post discharge.  Prognosis: Fair  Barriers to Discharge: Decreased caregiver support  Medical Staff Made Aware: Yes  End of Session Communication: PCT/NA/CTA, Bedside nurse  End of Session Patient Position: Bed, 3 rail up, Alarm on  Plan:  Treatment Interventions: ADL retraining, Functional transfer training, UE strengthening/ROM, Endurance training, Cognitive reorientation, Patient/family training, Equipment evaluation/education  OT Frequency: 3 times per week  OT Discharge Recommendations: Moderate intensity level of continued care    Subjective   Current Problem:  1. Hip dislocation, right, initial encounter (CMS/[facility])  Intubation    Moderate Sedation        General:  Reason for Referral: hip dislocation  Past Medical History Relevant to Rehab: 63-year-old female with PMHx HTN, seizures, cocaine abuse, asthma, migraines, hip dislocation frequent, chronic hip pain, chronic right shoulder pain  Patient Position Received: Bed, 3 rail up, Alarm on   Precautions:  Hearing/Visual Limitations: WFL  LE Weight Bearing Status: Weight Bearing as Tolerated  Braces Applied: Hip abduction brace in place on OT arrival.  Precautions Comment: Posterior hip precautions       Pain:  Pain Assessment  Pain Assessment: 0-10  Pain Score:  (Patient unable to provide pain number. She reports pain in her right shoulder indicating rotator cuff injury in past, and in her back.  Notified  CTA and nurse.)           Objective   Cognition:  Orientation Level:  (Patient oriented to day, month, year and grossly to hospital.)  Attention: Exceptions to WFL  Selective Attention: Impaired  Sustained Attention: Impaired    4AT  Alertness    Normal (fully alert, but not agitated, throughout assessment) 0          X Mild sleepiness for <10 seconds after waking, then normal 0  Clearly abnormal 4  Score: 0    AMT4  Age, date of birth, place (name of the hospital or building), current year  No mistakes 0  X 1 mistake 1 (grossly oriented to hospital, but not to exact hospital)  2 or more mistakes/untestable 2  Score: 1    Attention  Months of the year backwards Achieves   7 months or more correctly 0  X Starts but scores <7 months / refuses to start 1  Untestable (cannot start because unwell, drowsy, inattentive) 2  Score: 1    Acute change or fluctuating course  Evidence of significant change or fluctuation in: alertness, cognition, other mental function  (eg. paranoia, hallucinations) arising over the last 2 weeks and still evident in last 24hrs  X No 0  Yes 4  Score: 0    Total score: 2  4 or above: possible delirium +/- cognitive impairment  1-3: possible cognitive impairment  0: delirium or severe cognitive impairment unlikely (but  delirium still possible if [4] information incomplete)         Home Living:  Type of Home: Apartment  Home Adaptive Equipment: None   Prior Function:  Level of [place]: Independent with ADLs and functional transfers, Independent with homemaking with ambulation  ADL Assistance: Independent  Homemaking Assistance: Independent  Ambulatory Assistance: Independent  Hand Dominance: Left       ADL:  Grooming Assistance:  (set up for oral care, patient requries mod assist for hair)  Bathing Assistance: Maximal (lower body, SBA and set up upper body)  UE Dressing Assistance: Minimal (hospital gown, anticipate need for greater assist with tops/blouses)  LE Dressing Assistance:  Maximal  Toileting Assistance with Device: Unable to assess  Activity Tolerance:  Endurance:  (poor)  Bed Mobility/Transfers: Bed Mobility  Bed Mobility: Yes  Bed Mobility 1  Bed Mobility 1:  (Patient completed scooting in bed with SBA and verbal cues. She refused out of  bed activity.)   and Transfers  Transfer: No       Vision: Vision - Basic Assessment  Current Vision: Does not wear glasses   and    Sensation:  Light Touch:  (Patient denied numbness and tingling.)       Coordination:  Coordination Comment: WFL   Hand Function:     Extremities: RUE AROM (degrees)  RUE AROM Comment: right shoulder very minimal movement less than 10 degrees flexion, elbow grossly WFL active movement but patient resistive to movement due to pain in shoulder, wrist/digits WFL  RUE Strength  RUE Overall Strength:  (right shoulder 2-/5, elbow 2/5, wrist 3/5 based on observation), LUE AROM (degrees)  LUE AROM Comment: left shoulder approximately 45 degrees flexion, elbow/wrist WFL  LUE Strength  LUE Overall Strength:  (left shoulder 2/5, elbow and wrist 3/5 based on observation),  , and        Outcome Measures: [facility] Daily Activity  Putting on and taking off regular lower body clothing: A lot  Bathing (including washing, rinsing, drying): A lot  Putting on and taking off regular upper body clothing: A lot  Toileting, which includes using toilet, bedpan or urinal: Total  Taking care of personal grooming such as brushing teeth: A lot  Eating Meals: None  Daily Activity - Total Score: 13         ,     OT Adult Other Outcome Measures  4AT: 2 possible cognitive impairment    Education Documentation  Precautions, taught by Parisa Rebolledo OT at 11/13/2023 12:34 PM.  Learner: Patient  Readiness: Acceptance  Method: Explanation  Response: Needs Reinforcement  Comment: Patient instructed in role of OT and on hip precautions. Patient will require additional instruction.    Education Comments  No comments found.        Goals:   Encounter Problems          Add All  COGNITION/SAFETY  OT-Patient will recall and adhere to hip precautions during all functional mobility/ADL tasks in order to demonstrate improved understanding and promote healing post op    Today at 1234 - Progressing by Parisa Rebolledo, MATTEO    ADLs  OT-Patient will perform UB and LB bathing with minimal assist level of assistance and raised toilet seat and long-handled sponge.  Today at 1234 - Progressing by Parisa Rebolledo, OT    OT-Patient with complete upper body dressing with stand by assist level of assistance donning and doffing all UE clothes with no adaptive equipment while sitting in bedside chair.  Today at 1234 - Progressing by Parisa Rebolledo, OT    OT-Patient with complete lower body dressing with minimal assist level of assistance donning and doffing all LE clothes with reacher, shoe horn, and sock-aid while sitting in bedside chair.    Today at 1234 - Progressing by Parisa Rebolledo, OT    OT-Patient will complete daily grooming tasks with stand by assist level of assistance and PRN adaptive equipment while sitting in bedside chair.    Today at 1234 - Progressing by Parisa Rebolledo, OT    OT-Patient will complete toileting including hygiene clothing management/hygiene with minimal assist level of assistance and raised toilet seat and grab bars.    Today at 1234 - Progressing by Parisa Rebolledo, OT    TRANSFERS  OT-Patient will perform bed mobility minimal assist level of assistance and bed rails in order to improve safety and independence with mobility    Today at 1234 - Progressing by Parisa Rebolledo, OT    OT-Patient will complete functional transfer to all surfaces with front wheeled walker with minimal assist level of assistance.  Today at 1234 - Progressing by Parisa Rebolledo, OT    MOBILITY  OT-Patient will perform Functional mobility Household distances/ with minimal assist level of assistance and front wheeled walker in order to improve safety and functional mobility.    Today at 1234 -  Progressing by Parisa Rebolledo OT        11/13/23 at 12:38 PM   Parisa Rebolledo, OT   Rehab Office: [phone]

## 2023-11-13 NOTE — CARE PLAN
Problem: Fall/Injury  Goal: Not fall by end of shift  Outcome: Progressing  Goal: Be free from injury by end of the shift  Outcome: Progressing  Goal: Verbalize understanding of personal risk factors for fall in the hospital  Outcome: Progressing  Goal: Verbalize understanding of risk factor reduction measures to prevent injury from fall in the home  Outcome: Progressing  Goal: Use assistive devices by end of the shift  Outcome: Progressing  Goal: Pace activities to prevent fatigue by end of the shift  Outcome: Progressing     Problem: Pain  Goal: My pain/discomfort is manageable  Outcome: Progressing     Problem: Safety  Goal: Patient will be injury free during hospitalization  Outcome: Progressing  Goal: I will remain free of falls  Outcome: Progressing     Problem: Daily Care  Goal: Daily care needs are met  Outcome: Progressing     Problem: Psychosocial Needs  Goal: Demonstrates ability to cope with hospitalization/illness  Outcome: Progressing  Goal: Collaborate with me, my family, and caregiver to identify my specific goals  Outcome: Progressing     Problem: Discharge Barriers  Goal: My discharge needs are met  Outcome: Progressing     Problem: Skin  Goal: Decreased wound size/increased tissue granulation at next dressing change  Outcome: Progressing  Goal: Participates in plan/prevention/treatment measures  Outcome: Progressing  Goal: Prevent/manage excess moisture  Outcome: Progressing  Goal: Prevent/minimize sheer/friction injuries  Outcome: Progressing  Goal: Promote/optimize nutrition  Outcome: Progressing  Goal: Promote skin healing  Outcome: Progressing   The patient's goals for the shift include To decrease pain    The clinical goals for the shift include patient will remain without falls by the end of the shift

## 2023-11-13 NOTE — PROGRESS NOTES
11/13/23 1306   Discharge Planning   Living Arrangements Other (Comment)  (staying with god son)   Support Systems Family members  (Elizabeth Hillman (aunt) [phone])   Assistance Needed Independent   Type of Residence Other (Comment)  (Patient states she has been staying with different family members, no permanent residence)   Home or Post Acute Services Post acute facilities (Rehab/SNF/etc)   Type of Post Acute Facility Services Skilled nursing   Patient expects to be discharged to: new SNF   Does the patient need discharge transport arranged? Yes   RoundTrip coordination needed? Yes   Has discharge transport been arranged? No   Housing Stability   In the last 12 months, was there a time when you were not able to pay the mortgage or rent on time? N   In the last 12 months, was there a time when you did not have a steady place to sleep or slept in a shelter (including now)? N   Transportation Needs   In the past 12 months, has lack of transportation kept you from medical appointments or from getting medications? no   In the past 12 months, has lack of transportation kept you from meetings, work, or from getting things needed for daily living? No   Patient Choice   Provider Choice list and CMS website (https://medicare.gov/care-compare#search) for post-acute Quality and Resource Measure Data were provided and reviewed with: Patient     Transitional Care Coordination Progress Note:  Patient discussed during interdisciplinary rounds.   Team members present: IMANI GONSALEZ  Plan per Medical/Surgical team: Fall  Payor: [employer]  Discharge disposition: new SNF  Potential Barriers: none  ADOD: 1-2 days  Met with patient at bedside, provided introduction of self and role. Patient states she has been living with different family members, no permanent address. Patient states she is normally independent for adls, no assistive devices. Patient states she had a fall prior to admit; safe at home. Patient states she is not active  with a home health care agency. Patient states pcp Dr Jyoti Hough and she was there 2-3 weeks ago. Patient states she normally walks to appointments. Patient sates no concerns obtaining/affording medications. Patient refused CHW referral for housing/transportation resources. Discussed PT recommendations for moderate intensity therapy. Discussed differences between acute rehab, skilled nursing facility, home health care and outpatient therapy. Patient agreeable to skilled nursing facility. Educated regarding freedom of choice and provided financial disclosure. Facility list provided. Will continue to monitor for discharge planning needs.   Update 1545: Met with patient to discuss facility choices. Patient states she has not reviewed list and asked that this TCC follow up with her in the morning to give her time to review. Will follow up with patient in the morning. MD updated. Will continue to monitor for discharge planning needs.    Carolina KAT, RN  Transitional Care Coordinator (TCC)  [phone]

## 2023-11-13 NOTE — PROGRESS NOTES
"Physical Therapy    Physical Therapy Evaluation    Patient Name: Sonido Pickering  MRN: [id number]  Today's Date: 11/13/2023   Time Calculation  Start Time: 0830  Stop Time: 0845  Time Calculation (min): 15 min    Assessment/Plan   PT Assessment  PT Assessment Results: Decreased strength, Decreased endurance, Decreased range of motion, Impaired balance, Decreased mobility  Rehab Prognosis: Good  Barriers to Discharge: None  Evaluation/Treatment Tolerance: Patient limited by fatigue, Patient limited by pain  End of Session Communication: Bedside nurse  End of Session Patient Position: Bed, 3 rail up, Alarm on  IP OR SWING BED PT PLAN  Inpatient or Swing Bed: Inpatient  PT Plan  Treatment/Interventions: Bed mobility, Transfer training, Gait training, Balance training, Endurance training, Therapeutic activity, Therapeutic exercise, Range of motion  PT Plan: Skilled PT  PT Frequency: 3 times per week  PT Discharge Recommendations: Moderate intensity level of continued care  PT - OK to Discharge: Yes      Subjective   General Visit Information:  General  Reason for Referral: hip dislocation  Past Medical History Relevant to Rehab: 63-year-old female with PMHx HTN, seizures, cocaine abuse, asthma, migraines, hip dislocation frequent, chronic hip pain patient presents today to the ED for complaint of right hip dislocation. Ortho placed hip abduction brace and pt now to be WBAT w hip precautions.  Patient Position Received: Bed, 3 rail up, Alarm on  General Comment: Pt resting in bed upon entry. Initially reluctant to participate w PT as she noted she was tired. After much encouragement patient willing to participate w mobility.  Home Living:  Home Living  Type of Home: Apartment  Lives With:  (\"Friend\")  Home Adaptive Equipment: None  Home Living Comments: Pt reports no stairs.  Prior Level of Function:  Prior Function Per Pt/Caregiver Report  Level of [place]: Independent with ADLs and functional transfers (Pt " reports no needs at baseline. Up independently without device.)  Receives Help From:  (None)  ADL Assistance: Independent  Homemaking Assistance: Independent  Prior Function Comments: Pt reports no issues at baseline.  Precautions:  Precautions  LE Weight Bearing Status: Weight Bearing as Tolerated  Medical Precautions: Fall precautions  Braces Applied: Hip abduction brace already donned by ortho and on patient.  Precautions Comment: Posterior hip precautions  Vital Signs:       Objective   Pain:     Cognition:  Cognition  Overall Cognitive Status:  (Awake and alert though appearing restless. Following commands appropriately.)  Orientation Level: Disoriented to time  Impulsive: Mildly    General Assessments:  General Observation  General Observation: R hip abduction brace donned prior to PT visit and remained on patient.     Activity Tolerance  Endurance: Decreased tolerance for upright activites (Limited by fatigue and discomfort.)    Sensation  Light Touch: No apparent deficits    Strength  Strength Comments: RLE grossly 3/5, LLE 4/5 based on function.    Coordination  Movements are Fluid and Coordinated: Yes    Postural Control  Postural Control: Within Functional Limits    Static Sitting Balance  Static Sitting-Balance Support: Bilateral upper extremity supported  Static Sitting-Level of Assistance: Minimum assistance  Static Sitting-Comment/Number of Minutes: 5    Static Standing Balance  Static Standing-Balance Support:  (Pt unable to tolerate attempts to stand on this date.)  Functional Assessments:       Bed Mobility  Bed Mobility: Yes  Bed Mobility 1  Bed Mobility 1: Supine to sitting, Sitting to supine  Level of Assistance 1: Moderate assistance    Transfers  Transfer: No (Pt unable to tolerate attempts to stand due to fatigue and hip discomfort. Anticipate would require mod/max assist to perform.)    Ambulation/Gait Training  Ambulation/Gait Training Performed: No    Outcome Measures:  [facility] Basic  Mobility  Turning from your back to your side while in a flat bed without using bedrails: A lot  Moving from lying on your back to sitting on the side of a flat bed without using bedrails: A lot  Moving to and from bed to chair (including a wheelchair): A lot  Standing up from a chair using your arms (e.g. wheelchair or bedside chair): A lot  To walk in hospital room: Total  Climbing 3-5 steps with railing: Total  Basic Mobility - Total Score: 10    Encounter Problems       Encounter Problems (Active)       Mobility       STG - Patient will ambulate >15ft, wheeled walker, min assist       Start:  11/13/23    Expected End:  11/27/23               Safety       LTG - Patient will adhere to hip precautions during ADL's and transfers       Start:  11/13/23            LTG - Patient will demonstrate safety requirements appropriate to situation/environment       Start:  11/13/23            LTG - Patient will utilize safety techniques       Start:  11/13/23            STG - Patient uses call light consistently to request assistance with transfers       Start:  11/13/23               Transfers       STG - Patient to transfer to and from sit to supine min assist       Start:  11/13/23    Expected End:  11/27/23            STG - Patient will transfer sit to and from stand min assist       Start:  11/13/23    Expected End:  11/27/23                   Education Documentation  Mobility Training, taught by Don Ramsey PT at 11/13/2023  9:16 AM.  Learner: Patient  Readiness: Acceptance  Method: Explanation  Response: Verbalizes Understanding  Comment: Transfer training, discharge recs, use of call button.    Education Comments  No comments found.

## 2023-11-13 NOTE — CARE PLAN
Problem: Fall/Injury  Goal: Not fall by end of shift  Outcome: Progressing  Goal: Be free from injury by end of the shift  Outcome: Progressing  Goal: Verbalize understanding of personal risk factors for fall in the hospital  Outcome: Progressing  Goal: Verbalize understanding of risk factor reduction measures to prevent injury from fall in the home  Outcome: Progressing  Goal: Use assistive devices by end of the shift  Outcome: Progressing  Goal: Pace activities to prevent fatigue by end of the shift  Outcome: Progressing     Problem: Pain  Goal: My pain/discomfort is manageable  Outcome: Progressing     Problem: Safety  Goal: Patient will be injury free during hospitalization  Outcome: Progressing  Goal: I will remain free of falls  Outcome: Progressing     Problem: Daily Care  Goal: Daily care needs are met  Outcome: Progressing     Problem: Psychosocial Needs  Goal: Demonstrates ability to cope with hospitalization/illness  Outcome: Progressing  Goal: Collaborate with me, my family, and caregiver to identify my specific goals  Outcome: Progressing     Problem: Discharge Barriers  Goal: My discharge needs are met  Outcome: Progressing       The patient's goals for the shift include To decrease pain    The clinical goals for the shift include to keep pain level below 3

## 2023-11-13 NOTE — PROGRESS NOTES
"Sonido Pickering is a 63 y.o. female on hospital day 0 of admission presenting with Hip dislocation, right, initial encounter (CMS/[facility]).    Subjective     The patient states pain is well controlled.    Objective     GENERAL APPEARANCE: A&Ox3, appears in no acute distress  HEAD: normocephalic, atraumatic  THROAT: Oral cavity and pharynx normal. No inflammation, swelling, exudate, or lesions.  NECK: Neck supple, non-tender without lymphadenopathy, masses or thyromegaly.  CARDIAC: Normal S1 and S2. No S3, S4 or murmurs. Rhythm is regular. There is no peripheral edema, cyanosis or pallor. Extremities are warm and well perfused. No carotid bruits.  LUNGS: Clear to auscultation bilaterally without rales, rhonchi, wheezing or diminished breath sounds.  ABDOMEN: Positive bowel sounds. Soft, nondistended, nontender. No guarding or rebound. No masses.  EXTREMITIES: No significant deformity or joint abnormality. No edema. Peripheral pulses intact. No varicosities.  SKIN: Skin normal color, texture and turgor with no lesions or rash  PSYCHIATRIC: oriented to person, place, and time, good judgement and reason, without hallucinations, abnormal affect or abnormal behaviors during the examination. Patient is not suicidal.        Last Recorded Vitals  Blood pressure 149/89, pulse 72, temperature 36.8 °C (98.2 °F), resp. rate 16, height 1.6 m (5' 3\"), weight 52.6 kg (116 lb), SpO2 99 %.  Intake/Output last 3 Shifts:  I/O last 3 completed shifts:  In: - (0 mL/kg)   Out: 800 (15.2 mL/kg) [Urine:800 (0.4 mL/kg/hr)]  Weight: 52.6 kg     Relevant Results  Lab Results   Component Value Date    WBC 4.7 11/11/2023    HGB 10.9 (L) 11/11/2023    HCT 34.6 (L) 11/11/2023    MCV 89 11/11/2023     11/11/2023      Lab Results   Component Value Date    GLUCOSE 101 (H) 11/11/2023    CALCIUM 9.1 11/11/2023     11/11/2023    K 4.1 11/11/2023    CO2 26 11/11/2023     (H) 11/11/2023    BUN 18 11/11/2023    CREATININE 0.93 11/11/2023 "     Scheduled medications  acetaminophen, 975 mg, oral, q8h  cholecalciferol, 2,000 Units, oral, Daily  enoxaparin, 40 mg, subcutaneous, q24h  gabapentin, 600 mg, oral, TID  hydroCHLOROthiazide, 25 mg, oral, Daily  levETIRAcetam, 750 mg, oral, BID  melatonin, 10 mg, oral, Nightly  sennosides-docusate sodium, 2 tablet, oral, BID  thiamine, 100 mg, oral, Daily      Continuous medications  oxygen, , Last Rate: Stopped (11/12/23 1200)      PRN medications  PRN medications: cyclobenzaprine, hydrALAZINE, oxyCODONE, QUEtiapine    Assessment/Plan     Ms. Pickering is a 63-year-old female with PMHx HTN, seizures, cocaine abuse, asthma, migraines, hip dislocation frequent, chronic hip pain patient presents today to the ED for complaint of right hip dislocation denies any falls or injuries.  Ortho was down to see patient in the ED and ordered an orthotic hip brace for her patient is currently wearing brace on right hip.  They also reduced it in the ED patient to be admitted for PT OT eval and treat and possible SNF placement.  Assessment/Plan     Right hip dislocation  Chronic hip pain  -Ortho consult and appreciate recs  -Right hip orthotic brace per Ortho  -PT OT eval and treat, recommend moderate intensity therapy  -Pain acetaminophen scheduled and as needed oxy  -Flexeril as needed  -Continue home gabapentin 600 3 times daily     HTN  -Continue home hydrochlorothiazide 25 mg daily     Seizures  -New home Keppra 750 mg twice daily     Fluids: monitor and replete as needed  Electrolytes: monitor and replete as needed  Nutrition: Regular diet   GI prophylaxis: as needed  DVT prophylaxis: SCD  Code Status: full Code       Sudhakar Ye MD     The patient encounter includes all but not limited to; Evaluation of laboratory results, pertinent imaging, and vital signs. Daily updates are discussed with any consulting services and family/medical power of  as needed. The patient's discharge  process begins at admission and daily  contact with the patient's TCC and SW is pertinent in their efficient and safe discharge.

## 2023-11-13 NOTE — SIGNIFICANT EVENT
Rapid Response RN Note     Rapid Response called for HTN, pt's BP= 218/124; pt asymptomatic; NACR @ BS; PO hydralazine ordered/given; plan to recheck BP in 30-60mins & re-evaluate giving IV BP meds; pt to remain on HH3 @ this time; EKG done prior to mty arrival (SR); bedside RN encouraged to call with any further concerns

## 2023-11-14 LAB
ANION GAP SERPL CALC-SCNC: 15 MMOL/L (ref 10–20)
BUN SERPL-MCNC: 30 MG/DL (ref 6–23)
CALCIUM SERPL-MCNC: 8.6 MG/DL (ref 8.6–10.6)
CHLORIDE SERPL-SCNC: 98 MMOL/L (ref 98–107)
CO2 SERPL-SCNC: 25 MMOL/L (ref 21–32)
CREAT SERPL-MCNC: 1.55 MG/DL (ref 0.5–1.05)
ERYTHROCYTE [DISTWIDTH] IN BLOOD BY AUTOMATED COUNT: 13.1 % (ref 11.5–14.5)
GFR SERPL CREATININE-BSD FRML MDRD: 37 ML/MIN/1.73M*2
GLUCOSE SERPL-MCNC: 113 MG/DL (ref 74–99)
HCT VFR BLD AUTO: 32.5 % (ref 36–46)
HGB BLD-MCNC: 10.4 G/DL (ref 12–16)
MCH RBC QN AUTO: 28 PG (ref 26–34)
MCHC RBC AUTO-ENTMCNC: 32 G/DL (ref 32–36)
MCV RBC AUTO: 87 FL (ref 80–100)
NRBC BLD-RTO: 0 /100 WBCS (ref 0–0)
PLATELET # BLD AUTO: 319 X10*3/UL (ref 150–450)
POTASSIUM SERPL-SCNC: 3.7 MMOL/L (ref 3.5–5.3)
RBC # BLD AUTO: 3.72 X10*6/UL (ref 4–5.2)
SODIUM SERPL-SCNC: 134 MMOL/L (ref 136–145)
WBC # BLD AUTO: 6.2 X10*3/UL (ref 4.4–11.3)

## 2023-11-14 PROCEDURE — G0378 HOSPITAL OBSERVATION PER HR: HCPCS

## 2023-11-14 PROCEDURE — 36415 COLL VENOUS BLD VENIPUNCTURE: CPT | Performed by: STUDENT IN AN ORGANIZED HEALTH CARE EDUCATION/TRAINING PROGRAM

## 2023-11-14 PROCEDURE — 2500000001 HC RX 250 WO HCPCS SELF ADMINISTERED DRUGS (ALT 637 FOR MEDICARE OP): Mod: SE | Performed by: NURSE PRACTITIONER

## 2023-11-14 PROCEDURE — 80048 BASIC METABOLIC PNL TOTAL CA: CPT | Performed by: STUDENT IN AN ORGANIZED HEALTH CARE EDUCATION/TRAINING PROGRAM

## 2023-11-14 PROCEDURE — 99232 SBSQ HOSP IP/OBS MODERATE 35: CPT | Performed by: STUDENT IN AN ORGANIZED HEALTH CARE EDUCATION/TRAINING PROGRAM

## 2023-11-14 PROCEDURE — 85027 COMPLETE CBC AUTOMATED: CPT | Performed by: STUDENT IN AN ORGANIZED HEALTH CARE EDUCATION/TRAINING PROGRAM

## 2023-11-14 PROCEDURE — 2500000001 HC RX 250 WO HCPCS SELF ADMINISTERED DRUGS (ALT 637 FOR MEDICARE OP): Mod: SE | Performed by: STUDENT IN AN ORGANIZED HEALTH CARE EDUCATION/TRAINING PROGRAM

## 2023-11-14 RX ORDER — GABAPENTIN 300 MG/1
300 CAPSULE ORAL 3 TIMES DAILY
Status: DISCONTINUED | OUTPATIENT
Start: 2023-11-14 | End: 2023-11-17 | Stop reason: HOSPADM

## 2023-11-14 RX ORDER — OXYCODONE AND ACETAMINOPHEN 5; 325 MG/1; MG/1
1 TABLET ORAL EVERY 6 HOURS PRN
Status: DISCONTINUED | OUTPATIENT
Start: 2023-11-14 | End: 2023-11-17 | Stop reason: HOSPADM

## 2023-11-14 RX ADMIN — ACETAMINOPHEN 975 MG: 325 TABLET ORAL at 09:51

## 2023-11-14 RX ADMIN — ACETAMINOPHEN 975 MG: 325 TABLET ORAL at 00:57

## 2023-11-14 RX ADMIN — HYDROCHLOROTHIAZIDE 25 MG: 25 TABLET ORAL at 09:52

## 2023-11-14 RX ADMIN — OXYCODONE HYDROCHLORIDE 5 MG: 5 TABLET ORAL at 14:35

## 2023-11-14 RX ADMIN — THIAMINE HCL TAB 100 MG 100 MG: 100 TAB at 09:52

## 2023-11-14 RX ADMIN — LEVETIRACETAM 750 MG: 500 TABLET, FILM COATED ORAL at 20:38

## 2023-11-14 RX ADMIN — CYCLOBENZAPRINE 10 MG: 10 TABLET, FILM COATED ORAL at 20:38

## 2023-11-14 RX ADMIN — GABAPENTIN 300 MG: 300 CAPSULE ORAL at 16:16

## 2023-11-14 RX ADMIN — OXYCODONE HYDROCHLORIDE AND ACETAMINOPHEN 1 TABLET: 5; 325 TABLET ORAL at 20:38

## 2023-11-14 RX ADMIN — OXYCODONE HYDROCHLORIDE 5 MG: 5 TABLET ORAL at 06:15

## 2023-11-14 RX ADMIN — LEVETIRACETAM 750 MG: 500 TABLET, FILM COATED ORAL at 09:52

## 2023-11-14 RX ADMIN — Medication 2000 UNITS: at 09:52

## 2023-11-14 RX ADMIN — SENNOSIDES AND DOCUSATE SODIUM 2 TABLET: 8.6; 5 TABLET ORAL at 09:52

## 2023-11-14 RX ADMIN — ACETAMINOPHEN 975 MG: 325 TABLET ORAL at 16:16

## 2023-11-14 RX ADMIN — GABAPENTIN 300 MG: 300 CAPSULE ORAL at 20:38

## 2023-11-14 RX ADMIN — GABAPENTIN 600 MG: 300 CAPSULE ORAL at 09:54

## 2023-11-14 ASSESSMENT — COGNITIVE AND FUNCTIONAL STATUS - GENERAL
MOVING TO AND FROM BED TO CHAIR: A LITTLE
CLIMB 3 TO 5 STEPS WITH RAILING: A LOT
MOBILITY SCORE: 17
TURNING FROM BACK TO SIDE WHILE IN FLAT BAD: A LITTLE
CLIMB 3 TO 5 STEPS WITH RAILING: A LOT
TURNING FROM BACK TO SIDE WHILE IN FLAT BAD: A LITTLE
WALKING IN HOSPITAL ROOM: A LOT
MOVING TO AND FROM BED TO CHAIR: A LITTLE
DAILY ACTIVITIY SCORE: 22
HELP NEEDED FOR BATHING: A LITTLE
TOILETING: A LITTLE
STANDING UP FROM CHAIR USING ARMS: A LITTLE
MOBILITY SCORE: 17
WALKING IN HOSPITAL ROOM: A LOT
STANDING UP FROM CHAIR USING ARMS: A LITTLE

## 2023-11-14 ASSESSMENT — PAIN SCALES - GENERAL
PAINLEVEL_OUTOF10: 6
PAINLEVEL_OUTOF10: 7
PAINLEVEL_OUTOF10: 10 - WORST POSSIBLE PAIN
PAINLEVEL_OUTOF10: 8
PAINLEVEL_OUTOF10: 7

## 2023-11-14 ASSESSMENT — PAIN - FUNCTIONAL ASSESSMENT
PAIN_FUNCTIONAL_ASSESSMENT: 0-10
PAIN_FUNCTIONAL_ASSESSMENT: 0-10

## 2023-11-14 ASSESSMENT — PAIN DESCRIPTION - LOCATION
LOCATION: SHOULDER
LOCATION: ARM
LOCATION: ARM

## 2023-11-14 ASSESSMENT — PAIN DESCRIPTION - ORIENTATION
ORIENTATION: RIGHT
ORIENTATION: RIGHT

## 2023-11-14 NOTE — CARE PLAN
Problem: Fall/Injury  Goal: Verbalize understanding of personal risk factors for fall in the hospital  Outcome: Progressing  Goal: Verbalize understanding of risk factor reduction measures to prevent injury from fall in the home  Outcome: Progressing  Goal: Use assistive devices by end of the shift  Outcome: Progressing  Goal: Pace activities to prevent fatigue by end of the shift  Outcome: Progressing     Problem: Pain  Goal: My pain/discomfort is manageable  Outcome: Progressing     Problem: Daily Care  Goal: Daily care needs are met  Outcome: Progressing     Problem: Psychosocial Needs  Goal: Demonstrates ability to cope with hospitalization/illness  Outcome: Progressing  Goal: Collaborate with me, my family, and caregiver to identify my specific goals  Outcome: Progressing     Problem: Discharge Barriers  Goal: My discharge needs are met  Outcome: Progressing     Problem: Skin  Goal: Decreased wound size/increased tissue granulation at next dressing change  Outcome: Progressing  Goal: Participates in plan/prevention/treatment measures  Outcome: Progressing  Goal: Prevent/manage excess moisture  Outcome: Progressing  Goal: Prevent/minimize sheer/friction injuries  Outcome: Progressing  Goal: Promote/optimize nutrition  Outcome: Progressing  Goal: Promote skin healing  Outcome: Progressing     Problem: Pain - Adult  Goal: Verbalizes/displays adequate comfort level or baseline comfort level  Outcome: Progressing     Problem: Safety - Adult  Goal: Free from fall injury  Outcome: Progressing     Problem: Discharge Planning  Goal: Discharge to home or other facility with appropriate resources  Outcome: Progressing     Problem: Chronic Conditions and Co-morbidities  Goal: Patient's chronic conditions and co-morbidity symptoms are monitored and maintained or improved  Outcome: Progressing   The patient's goals for the shift include To decrease pain    The clinical goals for the shift include Patient will have a  decrease in right arm    Over the shift, the patient did not make progress toward the following goals. Barriers to progression include N/A. Recommendations to address these barriers include N/A.

## 2023-11-14 NOTE — CARE PLAN
The patient's goals for the shift include To decrease pain    The clinical goals for the shift include patient will remain without falls by the end of the shift    Over the shift, the patient did make progress toward the following goals. Barriers to progression include pain to right arm. Recommendations to address these barriers include pain medicine and performance of range of motion .

## 2023-11-14 NOTE — PROGRESS NOTES
"Sonido Pickering is a 63 y.o. female on hospital day 0 of admission presenting with Hip dislocation, right, initial encounter (CMS/[facility]).    Subjective     Patient complains of increased pain today. Brace appears to be unset, will ask orthotics to evaluate.    Objective     GENERAL APPEARANCE: A&Ox3, appears in no acute distress  HEAD: normocephalic, atraumatic  THROAT: Oral cavity and pharynx normal. No inflammation, swelling, exudate, or lesions.  NECK: Neck supple, non-tender without lymphadenopathy, masses or thyromegaly.  CARDIAC: Normal S1 and S2. No S3, S4 or murmurs. Rhythm is regular. There is no peripheral edema, cyanosis or pallor. Extremities are warm and well perfused. No carotid bruits.  LUNGS: Clear to auscultation bilaterally without rales, rhonchi, wheezing or diminished breath sounds.  ABDOMEN: Positive bowel sounds. Soft, nondistended, nontender. No guarding or rebound. No masses.  EXTREMITIES: No significant deformity or joint abnormality. No edema. Peripheral pulses intact. No varicosities.  SKIN: Skin normal color, texture and turgor with no lesions or rash  PSYCHIATRIC: oriented to person, place, and time, good judgement and reason, without hallucinations, abnormal affect or abnormal behaviors during the examination. Patient is not suicidal.        Last Recorded Vitals  Blood pressure 149/63, pulse 78, temperature 36.3 °C (97.3 °F), resp. rate 16, height 1.6 m (5' 3\"), weight 52.6 kg (116 lb), SpO2 97 %.  Intake/Output last 3 Shifts:  I/O last 3 completed shifts:  In: 480 (9.1 mL/kg) [P.O.:480]  Out: 1500 (28.5 mL/kg) [Urine:1500 (0.8 mL/kg/hr)]  Weight: 52.6 kg     Relevant Results  Lab Results   Component Value Date    WBC 6.2 11/14/2023    HGB 10.4 (L) 11/14/2023    HCT 32.5 (L) 11/14/2023    MCV 87 11/14/2023     11/14/2023      Lab Results   Component Value Date    GLUCOSE 113 (H) 11/14/2023    CALCIUM 8.6 11/14/2023     (L) 11/14/2023    K 3.7 11/14/2023    CO2 25 " 11/14/2023    CL 98 11/14/2023    BUN 30 (H) 11/14/2023    CREATININE 1.55 (H) 11/14/2023     Scheduled medications  acetaminophen, 975 mg, oral, q8h  cholecalciferol, 2,000 Units, oral, Daily  enoxaparin, 40 mg, subcutaneous, q24h  gabapentin, 300 mg, oral, TID  hydroCHLOROthiazide, 25 mg, oral, Daily  levETIRAcetam, 750 mg, oral, BID  melatonin, 10 mg, oral, Nightly  sennosides-docusate sodium, 2 tablet, oral, BID  thiamine, 100 mg, oral, Daily      Continuous medications  oxygen, , Last Rate: Stopped (11/12/23 1200)      PRN medications  PRN medications: cyclobenzaprine, hydrALAZINE, oxyCODONE-acetaminophen, QUEtiapine    Assessment/Plan     Ms. Pickering is a 63-year-old female with PMHx HTN, seizures, cocaine abuse, asthma, migraines, hip dislocation frequent, chronic hip pain patient presents today to the ED for complaint of right hip dislocation denies any falls or injuries.  Ortho was down to see patient in the ED and ordered an orthotic hip brace for her patient is currently wearing brace on right hip.  They also reduced it in the ED patient to be admitted for PT OT eval and treat and possible SNF placement.       Right hip dislocation  Chronic hip pain  - Ortho consult and appreciate recs  - Right hip orthotic brace per Ortho  - PT OT eval and treat, recommend moderate intensity therapy, precert initiated  - Pain acetaminophen scheduled and as needed oxy  - Flexeril as needed  - Continue home gabapentin 600 3 times daily  - Brace appears to be not fitting well, will ask orthotics to come take a look in the am     HTN  - Continue home hydrochlorothiazide 25 mg daily     Seizures  - New home Keppra 750 mg twice daily     Nutrition: Regular diet   GI prophylaxis: as needed  DVT prophylaxis: SCD  Code Status: full Code        Sudhakar Ye MD     The patient encounter includes all but not limited to; Evaluation of laboratory results, pertinent imaging, and vital signs. Daily updates are discussed with any  consulting services and family/medical power of  as needed. The patient's discharge  process begins at admission and daily contact with the patient's TCC and SW is pertinent in their efficient and safe discharge.

## 2023-11-14 NOTE — PROGRESS NOTES
Patient Care Navigator Note - Spoke to patient regarding SNF preferences.  Her preference is [facility].  Referral sent, awaiting acceptance.    Maxine Deal

## 2023-11-15 ENCOUNTER — APPOINTMENT (OUTPATIENT)
Dept: RADIOLOGY | Facility: HOSPITAL | Age: 63
End: 2023-11-15
Payer: COMMERCIAL

## 2023-11-15 LAB
ANION GAP SERPL CALC-SCNC: 16 MMOL/L
BUN SERPL-MCNC: 40 MG/DL (ref 6–23)
CALCIUM SERPL-MCNC: 9 MG/DL (ref 8.6–10.6)
CHLORIDE SERPL-SCNC: 99 MMOL/L (ref 98–107)
CO2 SERPL-SCNC: 25 MMOL/L (ref 21–32)
CREAT SERPL-MCNC: 1.29 MG/DL (ref 0.5–1.05)
GFR SERPL CREATININE-BSD FRML MDRD: 46 ML/MIN/1.73M*2
GLUCOSE SERPL-MCNC: 84 MG/DL (ref 74–99)
POTASSIUM SERPL-SCNC: 3.7 MMOL/L (ref 3.5–5.3)
SODIUM SERPL-SCNC: 136 MMOL/L (ref 136–145)

## 2023-11-15 PROCEDURE — 36415 COLL VENOUS BLD VENIPUNCTURE: CPT | Performed by: STUDENT IN AN ORGANIZED HEALTH CARE EDUCATION/TRAINING PROGRAM

## 2023-11-15 PROCEDURE — 80048 BASIC METABOLIC PNL TOTAL CA: CPT | Performed by: STUDENT IN AN ORGANIZED HEALTH CARE EDUCATION/TRAINING PROGRAM

## 2023-11-15 PROCEDURE — 76882 US LMTD JT/FCL EVL NVASC XTR: CPT | Mod: RIGHT SIDE | Performed by: RADIOLOGY

## 2023-11-15 PROCEDURE — 2500000001 HC RX 250 WO HCPCS SELF ADMINISTERED DRUGS (ALT 637 FOR MEDICARE OP): Mod: SE | Performed by: STUDENT IN AN ORGANIZED HEALTH CARE EDUCATION/TRAINING PROGRAM

## 2023-11-15 PROCEDURE — 99233 SBSQ HOSP IP/OBS HIGH 50: CPT | Performed by: STUDENT IN AN ORGANIZED HEALTH CARE EDUCATION/TRAINING PROGRAM

## 2023-11-15 PROCEDURE — G0378 HOSPITAL OBSERVATION PER HR: HCPCS

## 2023-11-15 PROCEDURE — 2500000001 HC RX 250 WO HCPCS SELF ADMINISTERED DRUGS (ALT 637 FOR MEDICARE OP): Mod: SE | Performed by: NURSE PRACTITIONER

## 2023-11-15 PROCEDURE — 76881 US COMPL JOINT R-T W/IMG: CPT | Mod: RT

## 2023-11-15 RX ADMIN — SENNOSIDES AND DOCUSATE SODIUM 2 TABLET: 8.6; 5 TABLET ORAL at 09:32

## 2023-11-15 RX ADMIN — GABAPENTIN 300 MG: 300 CAPSULE ORAL at 20:20

## 2023-11-15 RX ADMIN — SENNOSIDES AND DOCUSATE SODIUM 2 TABLET: 8.6; 5 TABLET ORAL at 20:20

## 2023-11-15 RX ADMIN — GABAPENTIN 300 MG: 300 CAPSULE ORAL at 14:53

## 2023-11-15 RX ADMIN — OXYCODONE HYDROCHLORIDE AND ACETAMINOPHEN 1 TABLET: 5; 325 TABLET ORAL at 20:24

## 2023-11-15 RX ADMIN — Medication 10 MG: at 20:20

## 2023-11-15 RX ADMIN — Medication 2000 UNITS: at 09:32

## 2023-11-15 RX ADMIN — GABAPENTIN 300 MG: 300 CAPSULE ORAL at 09:32

## 2023-11-15 RX ADMIN — OXYCODONE HYDROCHLORIDE AND ACETAMINOPHEN 1 TABLET: 5; 325 TABLET ORAL at 09:32

## 2023-11-15 RX ADMIN — THIAMINE HCL TAB 100 MG 100 MG: 100 TAB at 09:31

## 2023-11-15 RX ADMIN — ACETAMINOPHEN 975 MG: 325 TABLET ORAL at 14:53

## 2023-11-15 RX ADMIN — ACETAMINOPHEN 975 MG: 325 TABLET ORAL at 00:38

## 2023-11-15 RX ADMIN — LEVETIRACETAM 750 MG: 500 TABLET, FILM COATED ORAL at 09:32

## 2023-11-15 RX ADMIN — HYDROCHLOROTHIAZIDE 25 MG: 25 TABLET ORAL at 09:32

## 2023-11-15 RX ADMIN — LEVETIRACETAM 750 MG: 500 TABLET, FILM COATED ORAL at 20:20

## 2023-11-15 RX ADMIN — OXYCODONE HYDROCHLORIDE AND ACETAMINOPHEN 1 TABLET: 5; 325 TABLET ORAL at 03:31

## 2023-11-15 ASSESSMENT — PAIN SCALES - GENERAL
PAINLEVEL_OUTOF10: 8
PAINLEVEL_OUTOF10: 6
PAINLEVEL_OUTOF10: 0 - NO PAIN

## 2023-11-15 ASSESSMENT — COGNITIVE AND FUNCTIONAL STATUS - GENERAL
MOBILITY SCORE: 17
HELP NEEDED FOR BATHING: TOTAL
CLIMB 3 TO 5 STEPS WITH RAILING: A LOT
TOILETING: TOTAL
MOVING TO AND FROM BED TO CHAIR: A LITTLE
DRESSING REGULAR LOWER BODY CLOTHING: TOTAL
DRESSING REGULAR UPPER BODY CLOTHING: TOTAL
WALKING IN HOSPITAL ROOM: A LOT
PERSONAL GROOMING: A LOT
DAILY ACTIVITIY SCORE: 10
STANDING UP FROM CHAIR USING ARMS: A LITTLE
TURNING FROM BACK TO SIDE WHILE IN FLAT BAD: A LITTLE

## 2023-11-15 ASSESSMENT — PAIN DESCRIPTION - ORIENTATION
ORIENTATION: RIGHT
ORIENTATION: RIGHT

## 2023-11-15 ASSESSMENT — PAIN DESCRIPTION - LOCATION: LOCATION: ARM

## 2023-11-15 ASSESSMENT — PAIN - FUNCTIONAL ASSESSMENT: PAIN_FUNCTIONAL_ASSESSMENT: 0-10

## 2023-11-15 NOTE — PROGRESS NOTES
This TCC met with patient at bedside to discuss accepting skilled nursing facilities. Patient states she has not made a final decision. This TCC encouraged patient to review accepting facilities as she was medically ready for discharge. MD updated. Will continue to monitor for discharge planning needs.   Carolina KAT, RN  Transitional Care Coordinator (TCC)  [phone]

## 2023-11-15 NOTE — CARE PLAN
The patient's goals for the shift include To decrease pain    The clinical goals for the shift include Patient will have a decrease in right arm pain    Over the shift, the patient did not make progress toward the following goals. Barriers to progression include not performing range of motion do to pain. Recommendations to address these barriers include pain management.

## 2023-11-15 NOTE — PROGRESS NOTES
PCN updated pt on accepting SNFs. Patient states she may be interested in [facility] or [facility] because they are close to hospitals. Patient did not confirm FOC and states she will look into them. PCN updated TCC. PCN will follow up.      DEYANIRA Melchor

## 2023-11-15 NOTE — PROGRESS NOTES
"Sonido Pickering is a 63 y.o. female on hospital day 0 of admission presenting with Hip dislocation, right, initial encounter (CMS/[facility]).    Subjective     The patient complains of general pain.     Objective     GENERAL APPEARANCE: A&Ox3, appears in no acute distress  HEAD: normocephalic, atraumatic  THROAT: Oral cavity and pharynx normal. No inflammation, swelling, exudate, or lesions.  NECK: Neck supple, non-tender without lymphadenopathy, masses or thyromegaly.  CARDIAC: Normal S1 and S2. No S3, S4 or murmurs. Rhythm is regular. There is no peripheral edema, cyanosis or pallor. Extremities are warm and well perfused. No carotid bruits.  LUNGS: Clear to auscultation bilaterally without rales, rhonchi, wheezing or diminished breath sounds.  ABDOMEN: Positive bowel sounds. Soft, nondistended, nontender. No guarding or rebound. No masses.  EXTREMITIES: No significant deformity or joint abnormality. No edema. Peripheral pulses intact. No varicosities.  SKIN: Skin normal color, texture and turgor with no lesions or rash  PSYCHIATRIC: oriented to person, place, and time, good judgement and reason, without hallucinations, abnormal affect or abnormal behaviors during the examination. Patient is not suicidal.        Last Recorded Vitals  Blood pressure 142/70, pulse 68, temperature 36.6 °C (97.9 °F), resp. rate 16, height 1.6 m (5' 3\"), weight 52.6 kg (116 lb), SpO2 97 %.  Intake/Output last 3 Shifts:  No intake/output data recorded.    Relevant Results  Lab Results   Component Value Date    WBC 6.2 11/14/2023    HGB 10.4 (L) 11/14/2023    HCT 32.5 (L) 11/14/2023    MCV 87 11/14/2023     11/14/2023      Lab Results   Component Value Date    GLUCOSE 84 11/15/2023    CALCIUM 9.0 11/15/2023     11/15/2023    K 3.7 11/15/2023    CO2 25 11/15/2023    CL 99 11/15/2023    BUN 40 (H) 11/15/2023    CREATININE 1.29 (H) 11/15/2023     Scheduled medications  acetaminophen, 975 mg, oral, q8h  cholecalciferol, 2,000 " Units, oral, Daily  enoxaparin, 40 mg, subcutaneous, q24h  gabapentin, 300 mg, oral, TID  hydroCHLOROthiazide, 25 mg, oral, Daily  levETIRAcetam, 750 mg, oral, BID  melatonin, 10 mg, oral, Nightly  sennosides-docusate sodium, 2 tablet, oral, BID  thiamine, 100 mg, oral, Daily      Continuous medications  oxygen, , Last Rate: Stopped (11/12/23 1200)      PRN medications  PRN medications: cyclobenzaprine, hydrALAZINE, oxyCODONE-acetaminophen, QUEtiapine    Assessment/Plan     Ms. Pickering is a 63-year-old female with PMHx HTN, seizures, cocaine abuse, asthma, migraines, hip dislocation frequent, chronic hip pain patient presents today to the ED for complaint of right hip dislocation denies any falls or injuries.  Ortho was down to see patient in the ED and ordered an orthotic hip brace for her patient is currently wearing brace on right hip.  They also reduced it in the ED patient to be admitted for PT OT eval and treat and possible SNF placement.        Right hip dislocation  Chronic hip pain  - Ortho consult and appreciate recs  - Right hip orthotic brace per Ortho  - PT OT eval and treat, recommend moderate intensity therapy, precert initiated  - Pain acetaminophen scheduled and as needed oxy  - Flexeril as needed  - Continue home gabapentin 600 3 times daily  - Brace appears to be not fitting well, orthotics to assess  - Awaiting placement, patient having difficulty deciding options     HTN  - Continue home hydrochlorothiazide 25 mg daily     Seizures  - New home Keppra 750 mg twice daily     Nutrition: Regular diet   GI prophylaxis: as needed  DVT prophylaxis: SCD  Code Status: full Code        Sudhakar Ye MD     The patient encounter includes all but not limited to; Evaluation of laboratory results, pertinent imaging, and vital signs. Daily updates are discussed with any consulting services and family/medical power of  as needed. The patient's discharge  process begins at admission and daily contact with  the patient's TCC and SW is pertinent in their efficient and safe discharge.

## 2023-11-15 NOTE — PROGRESS NOTES
"Physical Therapy                 Therapy Communication Note    Patient Name: Sonido Pickering  MRN: [id number]  Today's Date: 11/15/2023     Discipline: Physical Therapy    Missed Visit Reason: Missed Visit Reason: Patient refused (Pt declined PT, wanting to finish breakfast and states \"I'm not in the right mood for therapy\".)    Missed Time: Attempt    "

## 2023-11-16 ENCOUNTER — APPOINTMENT (OUTPATIENT)
Dept: RADIOLOGY | Facility: HOSPITAL | Age: 63
End: 2023-11-16
Payer: COMMERCIAL

## 2023-11-16 LAB
ALBUMIN SERPL BCP-MCNC: 3 G/DL (ref 3.4–5)
ANION GAP SERPL CALC-SCNC: 12 MMOL/L (ref 10–20)
BUN SERPL-MCNC: 48 MG/DL (ref 6–23)
CALCIUM SERPL-MCNC: 9.3 MG/DL (ref 8.6–10.6)
CHLORIDE SERPL-SCNC: 101 MMOL/L (ref 98–107)
CO2 SERPL-SCNC: 29 MMOL/L (ref 21–32)
CREAT SERPL-MCNC: 1.08 MG/DL (ref 0.5–1.05)
GFR SERPL CREATININE-BSD FRML MDRD: 57 ML/MIN/1.73M*2
GLUCOSE SERPL-MCNC: 100 MG/DL (ref 74–99)
PHOSPHATE SERPL-MCNC: 4.5 MG/DL (ref 2.5–4.9)
POTASSIUM SERPL-SCNC: 4 MMOL/L (ref 3.5–5.3)
SODIUM SERPL-SCNC: 138 MMOL/L (ref 136–145)

## 2023-11-16 PROCEDURE — 73030 X-RAY EXAM OF SHOULDER: CPT | Mod: RT,FY

## 2023-11-16 PROCEDURE — 73030 X-RAY EXAM OF SHOULDER: CPT | Mod: RIGHT SIDE | Performed by: STUDENT IN AN ORGANIZED HEALTH CARE EDUCATION/TRAINING PROGRAM

## 2023-11-16 PROCEDURE — 97116 GAIT TRAINING THERAPY: CPT | Mod: GP,CQ

## 2023-11-16 PROCEDURE — 97530 THERAPEUTIC ACTIVITIES: CPT | Mod: GP,CQ

## 2023-11-16 PROCEDURE — 80069 RENAL FUNCTION PANEL: CPT | Performed by: STUDENT IN AN ORGANIZED HEALTH CARE EDUCATION/TRAINING PROGRAM

## 2023-11-16 PROCEDURE — 36415 COLL VENOUS BLD VENIPUNCTURE: CPT | Performed by: STUDENT IN AN ORGANIZED HEALTH CARE EDUCATION/TRAINING PROGRAM

## 2023-11-16 PROCEDURE — 74018 RADEX ABDOMEN 1 VIEW: CPT | Mod: FY

## 2023-11-16 PROCEDURE — 99233 SBSQ HOSP IP/OBS HIGH 50: CPT | Performed by: STUDENT IN AN ORGANIZED HEALTH CARE EDUCATION/TRAINING PROGRAM

## 2023-11-16 PROCEDURE — 2500000001 HC RX 250 WO HCPCS SELF ADMINISTERED DRUGS (ALT 637 FOR MEDICARE OP): Mod: SE | Performed by: STUDENT IN AN ORGANIZED HEALTH CARE EDUCATION/TRAINING PROGRAM

## 2023-11-16 PROCEDURE — 2500000001 HC RX 250 WO HCPCS SELF ADMINISTERED DRUGS (ALT 637 FOR MEDICARE OP): Performed by: NURSE PRACTITIONER

## 2023-11-16 PROCEDURE — 2500000001 HC RX 250 WO HCPCS SELF ADMINISTERED DRUGS (ALT 637 FOR MEDICARE OP): Performed by: STUDENT IN AN ORGANIZED HEALTH CARE EDUCATION/TRAINING PROGRAM

## 2023-11-16 PROCEDURE — 74018 RADEX ABDOMEN 1 VIEW: CPT | Performed by: RADIOLOGY

## 2023-11-16 PROCEDURE — G0378 HOSPITAL OBSERVATION PER HR: HCPCS

## 2023-11-16 RX ORDER — QUETIAPINE FUMARATE 25 MG/1
25 TABLET, FILM COATED ORAL 2 TIMES DAILY PRN
Start: 2023-11-16

## 2023-11-16 RX ORDER — AMOXICILLIN 250 MG
2 CAPSULE ORAL 2 TIMES DAILY
Start: 2023-11-16

## 2023-11-16 RX ORDER — ACETAMINOPHEN, DIPHENHYDRAMINE HCL, PHENYLEPHRINE HCL 325; 25; 5 MG/1; MG/1; MG/1
10 TABLET ORAL NIGHTLY
Start: 2023-11-16

## 2023-11-16 RX ORDER — ACETAMINOPHEN 325 MG/1
975 TABLET ORAL EVERY 8 HOURS
Start: 2023-11-16

## 2023-11-16 RX ORDER — OXYCODONE AND ACETAMINOPHEN 5; 325 MG/1; MG/1
1 TABLET ORAL EVERY 6 HOURS PRN
Qty: 5 TABLET | Refills: 0
Start: 2023-11-16 | End: 2023-11-17 | Stop reason: SDUPTHER

## 2023-11-16 RX ADMIN — ACETAMINOPHEN 975 MG: 325 TABLET ORAL at 09:11

## 2023-11-16 RX ADMIN — THIAMINE HCL TAB 100 MG 100 MG: 100 TAB at 09:12

## 2023-11-16 RX ADMIN — ACETAMINOPHEN 975 MG: 325 TABLET ORAL at 00:21

## 2023-11-16 RX ADMIN — GABAPENTIN 300 MG: 300 CAPSULE ORAL at 15:28

## 2023-11-16 RX ADMIN — Medication 2000 UNITS: at 12:45

## 2023-11-16 RX ADMIN — LEVETIRACETAM 750 MG: 500 TABLET, FILM COATED ORAL at 20:32

## 2023-11-16 RX ADMIN — GABAPENTIN 300 MG: 300 CAPSULE ORAL at 20:32

## 2023-11-16 RX ADMIN — SENNOSIDES AND DOCUSATE SODIUM 2 TABLET: 8.6; 5 TABLET ORAL at 20:32

## 2023-11-16 RX ADMIN — LEVETIRACETAM 750 MG: 500 TABLET, FILM COATED ORAL at 09:12

## 2023-11-16 RX ADMIN — OXYCODONE HYDROCHLORIDE AND ACETAMINOPHEN 1 TABLET: 5; 325 TABLET ORAL at 03:49

## 2023-11-16 RX ADMIN — ACETAMINOPHEN 975 MG: 325 TABLET ORAL at 16:25

## 2023-11-16 RX ADMIN — OXYCODONE HYDROCHLORIDE AND ACETAMINOPHEN 1 TABLET: 5; 325 TABLET ORAL at 10:52

## 2023-11-16 RX ADMIN — Medication 10 MG: at 20:32

## 2023-11-16 RX ADMIN — GABAPENTIN 300 MG: 300 CAPSULE ORAL at 09:12

## 2023-11-16 RX ADMIN — HYDROCHLOROTHIAZIDE 25 MG: 25 TABLET ORAL at 09:11

## 2023-11-16 RX ADMIN — SENNOSIDES AND DOCUSATE SODIUM 2 TABLET: 8.6; 5 TABLET ORAL at 09:11

## 2023-11-16 ASSESSMENT — COGNITIVE AND FUNCTIONAL STATUS - GENERAL
DRESSING REGULAR UPPER BODY CLOTHING: A LITTLE
MOVING FROM LYING ON BACK TO SITTING ON SIDE OF FLAT BED WITH BEDRAILS: A LITTLE
WALKING IN HOSPITAL ROOM: A LITTLE
TURNING FROM BACK TO SIDE WHILE IN FLAT BAD: A LITTLE
MOVING FROM LYING ON BACK TO SITTING ON SIDE OF FLAT BED WITH BEDRAILS: A LITTLE
DRESSING REGULAR LOWER BODY CLOTHING: A LITTLE
MOVING TO AND FROM BED TO CHAIR: A LITTLE
MOBILITY SCORE: 14
TOILETING: A LITTLE
MOBILITY SCORE: 17
DRESSING REGULAR LOWER BODY CLOTHING: A LITTLE
TOILETING: A LITTLE
CLIMB 3 TO 5 STEPS WITH RAILING: A LOT
MOBILITY SCORE: 17
DAILY ACTIVITIY SCORE: 19
WALKING IN HOSPITAL ROOM: TOTAL
PERSONAL GROOMING: A LITTLE
CLIMB 3 TO 5 STEPS WITH RAILING: A LOT
DRESSING REGULAR UPPER BODY CLOTHING: A LITTLE
TURNING FROM BACK TO SIDE WHILE IN FLAT BAD: A LITTLE
CLIMB 3 TO 5 STEPS WITH RAILING: TOTAL
STANDING UP FROM CHAIR USING ARMS: A LITTLE
PERSONAL GROOMING: A LITTLE
STANDING UP FROM CHAIR USING ARMS: A LITTLE
MOVING FROM LYING ON BACK TO SITTING ON SIDE OF FLAT BED WITH BEDRAILS: A LITTLE
STANDING UP FROM CHAIR USING ARMS: A LITTLE
HELP NEEDED FOR BATHING: A LITTLE
MOVING TO AND FROM BED TO CHAIR: A LITTLE
MOVING TO AND FROM BED TO CHAIR: A LITTLE
TURNING FROM BACK TO SIDE WHILE IN FLAT BAD: A LITTLE
HELP NEEDED FOR BATHING: A LITTLE
WALKING IN HOSPITAL ROOM: A LITTLE
DAILY ACTIVITIY SCORE: 19

## 2023-11-16 ASSESSMENT — PAIN SCALES - GENERAL
PAINLEVEL_OUTOF10: 0 - NO PAIN
PAINLEVEL_OUTOF10: 9
PAINLEVEL_OUTOF10: 0 - NO PAIN
PAINLEVEL_OUTOF10: 9
PAINLEVEL_OUTOF10: 0 - NO PAIN

## 2023-11-16 ASSESSMENT — PAIN - FUNCTIONAL ASSESSMENT
PAIN_FUNCTIONAL_ASSESSMENT: 0-10

## 2023-11-16 NOTE — PROGRESS NOTES
PCN followed up with pt. Patient confirmed FOC is [facility]. PCN asked SNF to start precert. Pending updated therapy notes for precert. [facility] needed for 7000 for precert. PCN updated TCC.    1306-[facility] complete. PCN updated [facility] for 7000. Pending updated PT note for precert.    [id number] complete. Pending updated PT/OT notes for precert. PCN updated [facility].    DEYANIRA Melchor

## 2023-11-16 NOTE — PROGRESS NOTES
"Sonido Pickering is a 63 y.o. female on hospital day 0 of admission presenting with Hip dislocation, right, initial encounter (CMS/[facility]).    Subjective     Patient's pain is mostly controlled. Complains of shoulder pain, will get xray.    Objective     GENERAL APPEARANCE: A&Ox3, appears in no acute distress  HEAD: normocephalic, atraumatic  THROAT: Oral cavity and pharynx normal. No inflammation, swelling, exudate, or lesions.  NECK: Neck supple, non-tender without lymphadenopathy, masses or thyromegaly.  CARDIAC: Normal S1 and S2. No S3, S4 or murmurs. Rhythm is regular. There is no peripheral edema, cyanosis or pallor. Extremities are warm and well perfused. No carotid bruits.  LUNGS: Clear to auscultation bilaterally without rales, rhonchi, wheezing or diminished breath sounds.  ABDOMEN: Positive bowel sounds. Soft, nondistended, nontender. No guarding or rebound. No masses.  EXTREMITIES: No significant deformity or joint abnormality. No edema. Peripheral pulses intact. No varicosities.  SKIN: Skin normal color, texture and turgor with no lesions or rash  PSYCHIATRIC: oriented to person, place, and time, good judgement and reason, without hallucinations, abnormal affect or abnormal behaviors during the examination. Patient is not suicidal.        Last Recorded Vitals  Blood pressure 126/76, pulse 72, temperature 36.1 °C (97 °F), resp. rate 17, height 1.6 m (5' 3\"), weight 52.6 kg (116 lb), SpO2 95 %.  Intake/Output last 3 Shifts:  I/O last 3 completed shifts:  In: - (0 mL/kg)   Out: 700 (13.3 mL/kg) [Urine:700 (0.4 mL/kg/hr)]  Weight: 52.6 kg     Relevant Results  Lab Results   Component Value Date    WBC 6.2 11/14/2023    HGB 10.4 (L) 11/14/2023    HCT 32.5 (L) 11/14/2023    MCV 87 11/14/2023     11/14/2023      Lab Results   Component Value Date    GLUCOSE 100 (H) 11/16/2023    CALCIUM 9.3 11/16/2023     11/16/2023    K 4.0 11/16/2023    CO2 29 11/16/2023     11/16/2023    BUN 48 (H) " 11/16/2023    CREATININE 1.08 (H) 11/16/2023     Scheduled medications  acetaminophen, 975 mg, oral, q8h  cholecalciferol, 2,000 Units, oral, Daily  enoxaparin, 40 mg, subcutaneous, q24h  gabapentin, 300 mg, oral, TID  hydroCHLOROthiazide, 25 mg, oral, Daily  levETIRAcetam, 750 mg, oral, BID  melatonin, 10 mg, oral, Nightly  sennosides-docusate sodium, 2 tablet, oral, BID  thiamine, 100 mg, oral, Daily      Continuous medications  oxygen, , Last Rate: Stopped (11/12/23 1200)      PRN medications  PRN medications: cyclobenzaprine, hydrALAZINE, oxyCODONE-acetaminophen, QUEtiapine    Assessment/Plan     Ms. Pickering is a 63-year-old female with PMHx HTN, seizures, cocaine abuse, asthma, migraines, hip dislocation frequent, chronic hip pain patient presents today to the ED for complaint of right hip dislocation denies any falls or injuries.  Ortho was down to see patient in the ED and ordered an orthotic hip brace for her patient is currently wearing brace on right hip.  They also reduced it in the ED patient to be admitted for PT OT eval and treat and possible SNF placement.        Right hip dislocation  Chronic hip pain  - Ortho consult and appreciate recs  - Right hip orthotic brace per Ortho  - PT OT eval and treat, recommend moderate intensity therapy, precert initiated  - Pain acetaminophen scheduled and as needed oxy  - Flexeril as needed  - Continue home gabapentin 600 3 times daily  - Brace appears to be not fitting well, orthotics to assess  - Awaiting placement, patient having difficulty deciding options     HTN  - Continue home hydrochlorothiazide 25 mg daily     Seizures  - New home Keppra 750 mg twice daily     Nutrition: Regular diet   GI prophylaxis: as needed  DVT prophylaxis: SCD  Code Status: full Code        Sudhakar Ye MD     The patient encounter includes all but not limited to; Evaluation of laboratory results, pertinent imaging, and vital signs. Daily updates are discussed with any consulting  services and family/medical power of  as needed. The patient's discharge  process begins at admission and daily contact with the patient's TCC and SW is pertinent in their efficient and safe discharge.

## 2023-11-16 NOTE — PROGRESS NOTES
Physical Therapy    Physical Therapy Treatment    Patient Name: Sonido Pickering  MRN: [id number]  Today's Date: 11/16/2023  Time Calculation  Start Time: 1420  Stop Time: 1448  Time Calculation (min): 28 min       Assessment/Plan   PT Assessment  End of Session Communication: Bedside nurse  Assessment Comment: Pt tolerated PT session well, performed bed mobility, STS transfers and ambulated within room. Pt would continue to benefit from Moderate intensity PT upon D/C from hospital.  End of Session Patient Position: Bed, 3 rail up, Alarm off, not on at start of session  PT Plan  Inpatient/Swing Bed or Outpatient: Inpatient  PT Plan  Treatment/Interventions: Bed mobility, Transfer training, Gait training, Balance training, Endurance training, Therapeutic activity, Therapeutic exercise, Range of motion  PT Plan: Skilled PT  PT Frequency: 3 times per week  PT Discharge Recommendations: Moderate intensity level of continued care  PT - OK to Discharge: Yes      General Visit Information:   PT  Visit  PT Received On: 11/16/23  General  Prior to Session Communication: Bedside nurse  Patient Position Received: Bed, 3 rail up, Alarm off, not on at start of session  General Comment: Pt supine in bed, agreeable to work with PT, asking for assistance to order lunch prior to starting session.    Subjective   Precautions:  Precautions  LE Weight Bearing Status: Weight Bearing as Tolerated  Medical Precautions: Fall precautions  Braces Applied: Hip abduction brace in place  Vital Signs:       Objective   Pain:  Pain Assessment  Pain Assessment: 0-10  Pain Score: 0 - No pain (pt did not rate pain however reports some R shoulder discomfort and R hip pain upon initial stand)    Treatments:  Therapeutic Activity  Therapeutic Activity Performed: Yes  Therapeutic Activity 1: Pt performed bed mobility with SBA and HOB max elevated, Cues for hand placement and maintaining R posterior hip precautions.  Therapeutic Activity 2: Pt performed  x2 STS transfers from EOB, Min-CGA provided. Cues for proper hand placement and proper sequencing to maintain hip precautions.    Bed Mobility  Bed Mobility: Yes  Bed Mobility 1  Bed Mobility 1: Supine to sitting  Level of Assistance 1:  (SBA)  Bed Mobility Comments 1: HOB max elevated, cues for hand placement  Bed Mobility 2  Bed Mobility  2: Sitting to supine  Level of Assistance 2:  (SBA)    Ambulation/Gait Training  Ambulation/Gait Training Performed: Yes  Ambulation/Gait Training 1  Surface 1: Level tile  Device 1: No device (Pt declined use of AD)  Gait Support Devices:  (Hip Abduction brace)  Assistance 1: Minimum assistance, Arm in arm assistance (x2, progressed to CGA)  Quality of Gait 1: Inconsistent stride length (Decreased génesis, step/stride length, slightly Wide SHAINA.)  Comments/Distance (ft) 1: x20ft  Ambulation/Gait Training 2  Surface 2: Level tile  Device 2: No device (pt declined use of AD)  Gait Support Devices:  (Hip Abduction Brace)  Assistance 2: Contact guard  Quality of Gait 2: Inconsistent stride length (Decreaed génesis, short step length, wide SHANIA. Increased lateral sway.)  Comments/Distance (ft) 2: x20ft      Transfers  Transfer: Yes  Transfer 1  Transfer From 1: Bed to  Transfer to 1: Stand  Technique 1: Sit to stand, Stand to sit  Transfer Level of Assistance 1: Minimum assistance, +2, Minimal verbal cues  Trials/Comments 1: x2, cues for proper hand placement to maintain hip precautions throughout transfer    Stairs  Stairs: No    Outcome Measures:  [facility] Basic Mobility  Turning from your back to your side while in a flat bed without using bedrails: A little  Moving from lying on your back to sitting on the side of a flat bed without using bedrails: A little  Moving to and from bed to chair (including a wheelchair): A little  Standing up from a chair using your arms (e.g. wheelchair or bedside chair): A little  To walk in hospital room: A little  Climbing 3-5 steps with railing: A  lot  Basic Mobility - Total Score: 17    Education Documentation  Precautions, taught by Etelvina Ocasio PTA at 11/16/2023  3:24 PM.  Learner: Patient  Readiness: Acceptance  Method: Explanation  Response: Verbalizes Understanding, Needs Reinforcement    Mobility Training, taught by Etelvina Ocasio PTA at 11/16/2023  3:24 PM.  Learner: Patient  Readiness: Acceptance  Method: Explanation  Response: Verbalizes Understanding, Needs Reinforcement    Education Comments  No comments found.        OP EDUCATION:       Encounter Problems       Encounter Problems (Active)       Mobility       STG - Patient will ambulate >15ft, wheeled walker, min assist (Progressing)       Start:  11/13/23    Expected End:  11/27/23               Pain - Adult          Safety       LTG - Patient will adhere to hip precautions during ADL's and transfers       Start:  11/13/23            LTG - Patient will demonstrate safety requirements appropriate to situation/environment       Start:  11/13/23            LTG - Patient will utilize safety techniques       Start:  11/13/23            STG - Patient uses call light consistently to request assistance with transfers       Start:  11/13/23               Transfers       STG - Patient to transfer to and from sit to supine min assist (Progressing)       Start:  11/13/23    Expected End:  11/27/23            STG - Patient will transfer sit to and from stand min assist (Progressing)       Start:  11/13/23    Expected End:  11/27/23

## 2023-11-16 NOTE — PROGRESS NOTES
Transitional Care Coordination Progress Note:  Patient discussed during interdisciplinary rounds.   Team members present: MD, IMANI  Plan per Medical/Surgical team: right hip dislocation  [facility]  Discharge disposition: [facility]  Potential Barriers: insurance authorization  ADOD: 1-2 days  FOC per patient is [facility]. Awaiting updated therapy notes for facility to start precert. Therapy updated of needs. MD updated. Will continue to monitor for discharge planning needs.     Carolina PRON, RN  Transitional Care Coordinator (TCC)  [phone]

## 2023-11-17 VITALS
TEMPERATURE: 98.1 F | HEART RATE: 68 BPM | HEIGHT: 63 IN | WEIGHT: 116 LBS | RESPIRATION RATE: 18 BRPM | DIASTOLIC BLOOD PRESSURE: 72 MMHG | BODY MASS INDEX: 20.55 KG/M2 | OXYGEN SATURATION: 97 % | SYSTOLIC BLOOD PRESSURE: 124 MMHG

## 2023-11-17 LAB
ALBUMIN SERPL BCP-MCNC: 2.9 G/DL (ref 3.4–5)
ANION GAP SERPL CALC-SCNC: 8 MMOL/L (ref 10–20)
BUN SERPL-MCNC: 37 MG/DL (ref 6–23)
CALCIUM SERPL-MCNC: 9.3 MG/DL (ref 8.6–10.6)
CHLORIDE SERPL-SCNC: 102 MMOL/L (ref 98–107)
CO2 SERPL-SCNC: 30 MMOL/L (ref 21–32)
CREAT SERPL-MCNC: 0.94 MG/DL (ref 0.5–1.05)
GFR SERPL CREATININE-BSD FRML MDRD: 68 ML/MIN/1.73M*2
GLUCOSE SERPL-MCNC: 112 MG/DL (ref 74–99)
PHOSPHATE SERPL-MCNC: 4 MG/DL (ref 2.5–4.9)
POTASSIUM SERPL-SCNC: 3.7 MMOL/L (ref 3.5–5.3)
SODIUM SERPL-SCNC: 136 MMOL/L (ref 136–145)

## 2023-11-17 PROCEDURE — 2500000001 HC RX 250 WO HCPCS SELF ADMINISTERED DRUGS (ALT 637 FOR MEDICARE OP): Performed by: NURSE PRACTITIONER

## 2023-11-17 PROCEDURE — 80069 RENAL FUNCTION PANEL: CPT | Performed by: STUDENT IN AN ORGANIZED HEALTH CARE EDUCATION/TRAINING PROGRAM

## 2023-11-17 PROCEDURE — 99239 HOSP IP/OBS DSCHRG MGMT >30: CPT | Performed by: STUDENT IN AN ORGANIZED HEALTH CARE EDUCATION/TRAINING PROGRAM

## 2023-11-17 PROCEDURE — G0378 HOSPITAL OBSERVATION PER HR: HCPCS

## 2023-11-17 PROCEDURE — 2500000001 HC RX 250 WO HCPCS SELF ADMINISTERED DRUGS (ALT 637 FOR MEDICARE OP): Mod: SE | Performed by: STUDENT IN AN ORGANIZED HEALTH CARE EDUCATION/TRAINING PROGRAM

## 2023-11-17 PROCEDURE — 36415 COLL VENOUS BLD VENIPUNCTURE: CPT | Performed by: STUDENT IN AN ORGANIZED HEALTH CARE EDUCATION/TRAINING PROGRAM

## 2023-11-17 PROCEDURE — 97535 SELF CARE MNGMENT TRAINING: CPT | Mod: GO

## 2023-11-17 PROCEDURE — 97530 THERAPEUTIC ACTIVITIES: CPT | Mod: GO

## 2023-11-17 RX ORDER — OXYCODONE AND ACETAMINOPHEN 5; 325 MG/1; MG/1
1 TABLET ORAL EVERY 4 HOURS PRN
Qty: 18 TABLET | Refills: 0 | Status: SHIPPED | OUTPATIENT
Start: 2023-11-17 | End: 2023-11-20

## 2023-11-17 RX ADMIN — ACETAMINOPHEN 975 MG: 325 TABLET ORAL at 15:31

## 2023-11-17 RX ADMIN — ACETAMINOPHEN 975 MG: 325 TABLET ORAL at 09:11

## 2023-11-17 RX ADMIN — LEVETIRACETAM 750 MG: 500 TABLET, FILM COATED ORAL at 09:25

## 2023-11-17 RX ADMIN — GABAPENTIN 300 MG: 300 CAPSULE ORAL at 09:10

## 2023-11-17 RX ADMIN — HYDROCHLOROTHIAZIDE 25 MG: 25 TABLET ORAL at 09:11

## 2023-11-17 RX ADMIN — SENNOSIDES AND DOCUSATE SODIUM 2 TABLET: 8.6; 5 TABLET ORAL at 09:10

## 2023-11-17 RX ADMIN — OXYCODONE HYDROCHLORIDE AND ACETAMINOPHEN 1 TABLET: 5; 325 TABLET ORAL at 00:23

## 2023-11-17 RX ADMIN — GABAPENTIN 300 MG: 300 CAPSULE ORAL at 14:06

## 2023-11-17 RX ADMIN — THIAMINE HCL TAB 100 MG 100 MG: 100 TAB at 09:11

## 2023-11-17 RX ADMIN — OXYCODONE HYDROCHLORIDE AND ACETAMINOPHEN 1 TABLET: 5; 325 TABLET ORAL at 09:25

## 2023-11-17 RX ADMIN — Medication 2000 UNITS: at 09:10

## 2023-11-17 RX ADMIN — OXYCODONE HYDROCHLORIDE AND ACETAMINOPHEN 1 TABLET: 5; 325 TABLET ORAL at 15:31

## 2023-11-17 ASSESSMENT — COGNITIVE AND FUNCTIONAL STATUS - GENERAL
MOVING FROM LYING ON BACK TO SITTING ON SIDE OF FLAT BED WITH BEDRAILS: A LITTLE
DAILY ACTIVITIY SCORE: 19
TURNING FROM BACK TO SIDE WHILE IN FLAT BAD: A LITTLE
PERSONAL GROOMING: A LITTLE
PERSONAL GROOMING: A LITTLE
STANDING UP FROM CHAIR USING ARMS: A LITTLE
MOVING TO AND FROM BED TO CHAIR: A LITTLE
DRESSING REGULAR UPPER BODY CLOTHING: A LITTLE
MOBILITY SCORE: 17
DRESSING REGULAR UPPER BODY CLOTHING: A LITTLE
TOILETING: A LOT
DAILY ACTIVITIY SCORE: 16
DRESSING REGULAR LOWER BODY CLOTHING: A LITTLE
HELP NEEDED FOR BATHING: A LITTLE
HELP NEEDED FOR BATHING: A LOT
CLIMB 3 TO 5 STEPS WITH RAILING: A LOT
WALKING IN HOSPITAL ROOM: A LITTLE
DRESSING REGULAR LOWER BODY CLOTHING: A LOT
TOILETING: A LITTLE

## 2023-11-17 ASSESSMENT — PAIN DESCRIPTION - DESCRIPTORS: DESCRIPTORS: JABBING

## 2023-11-17 ASSESSMENT — PAIN DESCRIPTION - LOCATION: LOCATION: PELVIS

## 2023-11-17 ASSESSMENT — PAIN SCALES - GENERAL
PAINLEVEL_OUTOF10: 8
PAINLEVEL_OUTOF10: 5 - MODERATE PAIN
PAINLEVEL_OUTOF10: 4

## 2023-11-17 ASSESSMENT — PAIN - FUNCTIONAL ASSESSMENT
PAIN_FUNCTIONAL_ASSESSMENT: 0-10

## 2023-11-17 ASSESSMENT — ACTIVITIES OF DAILY LIVING (ADL): HOME_MANAGEMENT_TIME_ENTRY: 31

## 2023-11-17 ASSESSMENT — PAIN DESCRIPTION - ORIENTATION: ORIENTATION: RIGHT;LEFT

## 2023-11-17 NOTE — DISCHARGE SUMMARY
Discharge Diagnosis  Hip dislocation, right, initial encounter (CMS/Formerly [facility])    Issues Requiring Follow-Up  Discharge to SNF, will follow-up with PCP and ortho    Test Results Pending At Discharge  None      Hospital Course    Ms. Pickering is a 63-year-old female with PMHx HTN, seizures, cocaine abuse, asthma, migraines, hip dislocation frequent, chronic hip pain patient presents today to the ED for complaint of right hip dislocation denies any falls or injuries.  Ortho saw the patient in the ED and ordered and reduced her hip. orthotic hip brace for her patient is currently on. Admitted for PT OT eval and treat and possible SNF placement, will discharge to SNF.        Right hip dislocation  Chronic hip pain  - Ortho consulted, reduced hip and ordered brace  - Right hip orthotic brace per Ortho  - PT OT eval and treat, recommend moderate intensity therapy  - Pain acetaminophen scheduled and as needed oxy  - Flexeril as needed  - Continue home gabapentin 600 3 times daily  - Brace in place correctly  - Discharge in stable condition to SNF    Patient also complained of right shoulder pain during this admission. Imaging showed severe osteoarthritis.    The patient was discharged in stable condition. Discharge took >30 mins.    Pertinent Physical Exam At Time of Discharge  GENERAL APPEARANCE: A&Ox3, appears in no acute distress  HEAD: normocephalic, atraumatic  THROAT: Oral cavity and pharynx normal. No inflammation, swelling, exudate, or lesions.  NECK: Neck supple, non-tender without lymphadenopathy, masses or thyromegaly.  CARDIAC: Normal S1 and S2. No S3, S4 or murmurs. Rhythm is regular. There is no peripheral edema, cyanosis or pallor. Extremities are warm and well perfused. No carotid bruits.  LUNGS: Clear to auscultation bilaterally without rales, rhonchi, wheezing or diminished breath sounds.  ABDOMEN: Positive bowel sounds. Soft, nondistended, nontender. No guarding or rebound. No masses.  EXTREMITIES: No  significant deformity or joint abnormality. No edema. Peripheral pulses intact. No varicosities.  SKIN: Skin normal color, texture and turgor with no lesions or rash  PSYCHIATRIC: oriented to person, place, and time, good judgement and reason, without hallucinations, abnormal affect or abnormal behaviors during the examination. Patient is not suicidal.        Home Medications     Medication List      START taking these medications     acetaminophen 325 mg tablet; Commonly known as: Tylenol; Take 3 tablets   (975 mg) by mouth every 8 hours.   melatonin 10 mg tablet; Take 1 tablet (10 mg) by mouth once daily at   bedtime.   oxyCODONE-acetaminophen 5-325 mg tablet; Commonly known as: Percocet;   Take 1 tablet by mouth every 4 hours if needed for severe pain (7 - 10)   for up to 3 days.   QUEtiapine 25 mg tablet; Commonly known as: SEROquel; Take 1 tablet (25   mg) by mouth 2 times a day as needed (Agitation, Restlessness, Panic).   sennosides-docusate sodium 8.6-50 mg tablet; Commonly known as:   Loraine-Colace; Take 2 tablets by mouth 2 times a day.     CONTINUE taking these medications     albuterol 90 mcg/actuation inhaler   cholecalciferol 50 MCG (2000 UT) tablet; Commonly known as: Vitamin D-3   cyclobenzaprine 10 mg tablet; Commonly known as: Flexeril   gabapentin 300 mg capsule; Commonly known as: Neurontin   hydroCHLOROthiazide 25 mg tablet; Commonly known as: HYDRODiuril   levETIRAcetam 750 mg tablet; Commonly known as: Keppra   thiamine 100 mg tablet; Commonly known as: Vitamin B-1       Outpatient Follow-Up  PCP appointment pending    Dell Ye MD

## 2023-11-17 NOTE — PROGRESS NOTES
11/17/23 1600   Discharge Planning   Home or Post Acute Services Post acute facilities (Rehab/SNF/etc)   Type of Post Acute Facility Services Skilled nursing   Patient expects to be discharged to: [facility]   Does the patient need discharge transport arranged? Yes   RoundTrip coordination needed? Yes   Has discharge transport been arranged? Yes   What day is the transport expected? 11/17/23   What time is the transport expected? 1800     Precert obtained for [facility]. Patient to discharge today. Transport arranged via [facility] ([phone]) for 6pm. Report #: [id number], room 2303. Patient, MD, Uma CASTELLANOS aware. Blue slip delivered to unit secretary. Spoke with Aliyah at facility, 7000 completed however patient in observation. Aliyah stated they are ok with 7000 and PASSR does not need completed. CNC updated.   Carolina PRON, RN  Transitional Care Coordinator (TCC)  [phone]

## 2023-11-17 NOTE — PROGRESS NOTES
Occupational Therapy    Occupational Therapy Treatment    Name: Sonido Pickering  MRN: [id number]  : 1960  Date: 23       Assessment:     Plan:  Treatment Interventions: ADL retraining, Functional transfer training, UE strengthening/ROM, Endurance training, Cognitive reorientation, Patient/family training, Equipment evaluation/education  OT Frequency: 3 times per week  OT Discharge Recommendations: Moderate intensity level of continued care    Subjective   Previous Visit Info:     Vitals:     Pain Assessment:  Pain Assessment  Pain Assessment: 0-10  Pain Score: 4  Pain Type: Acute pain  Pain Location: Arm (upper)  Pain Orientation: Posterior  Pain Descriptors: Jabbing  Pain Frequency: Intermittent  Pain Interventions: Heat applied (self applied hot pack)  Response to Interventions: decreased pain with heat repositioning       UE Dressing  UE Dressing Level of Assistance: Setup (donned gown)  UE Dressing Where Assessed: Edge of bed    LE Dressing  LE Dressing: Yes  Pants Level of Assistance: Maximum assistance (TOTAL A to don hip abductor brace; MAX A for pants)  Sock Level of Assistance: Minimum assistance (S/P INSTRUCTION FOR USE OF AE/SOCK AID + REACHER; MOD VERBAL AND TACTILE CUES FOR RECALL OF POSTERIOR HIP PRECAUTIONS DURING RETURN DEMO)              Bed Mobility/Transfers: Bed Mobility  Bed Mobility: Yes  Bed Mobility 1  Level of Assistance 1: Minimum assistance (SUPINE > SIT)  Bed Mobility 2  Bed Mobility  2:  (MOD A SIT > SUPINE)         Sitting Balance:  Static Sitting Balance  Static Sitting-Balance Support: No upper extremity supported, Feet supported (SBA)    Outcome Measures:  [facility] Daily Activity  Putting on and taking off regular lower body clothing: A lot  Bathing (including washing, rinsing, drying): A lot  Putting on and taking off regular upper body clothing: A little  Toileting, which includes using toilet, bedpan or urinal: A lot  Taking care of personal grooming such as brushing  teeth: A little  Eating Meals: None  Daily Activity - Total Score: 16        Education Documentation  No documentation found.  Education Comments  No comments found.      Goals:  Encounter Problems       Encounter Problems (Active)       ADLs       OT-Patient will perform UB and LB bathing  with minimal assist  level of assistance and raised toilet seat and long-handled sponge. (Progressing)       Start:  11/13/23    Expected End:  11/27/23            OT-Patient with complete upper body dressing with stand by assist level of assistance donning and doffing all UE clothes with no adaptive equipment while sitting in bedside chair. (Progressing)       Start:  11/13/23    Expected End:  11/27/23            OT-Patient with complete lower body dressing with minimal assist  level of assistance donning and doffing all LE clothes  with reacher, shoe horn, and sock-aid while sitting in bedside chair. (Progressing)       Start:  11/13/23    Expected End:  11/27/23            OT-Patient will complete daily grooming tasks with stand by assist level of assistance and PRN adaptive equipment while sitting in bedside chair. (Progressing)       Start:  11/13/23    Expected End:  11/27/23            OT-Patient will complete toileting including hygiene clothing management/hygiene with minimal assist  level of assistance and raised toilet seat and grab bars. (Progressing)       Start:  11/13/23    Expected End:  11/27/23               COGNITION/SAFETY       OT-Patient will recall and adhere to hip precautions during all functional mobility/ADL tasks in order to demonstrate improved understanding and promote healing post op (Progressing)       Start:  11/13/23    Expected End:  11/27/23               MOBILITY       OT-Patient will perform Functional mobility  Household distances/ with minimal assist  level of assistance and front wheeled walker in order to improve safety and functional mobility. (Progressing)       Start:  11/13/23     Expected End:  11/27/23                               TRANSFERS       OT-Patient will perform bed mobility minimal assist  level of assistance and bed rails in order to improve safety and independence with mobility (Progressing)       Start:  11/13/23    Expected End:  11/27/23            OT-Patient will complete functional transfer to all surfaces with front wheeled walker with minimal assist  level of assistance. (Progressing)       Start:  11/13/23    Expected End:  11/27/23

## 2023-11-17 NOTE — CARE PLAN
Problem: Fall/Injury  Goal: Use assistive devices by end of the shift  Outcome: Progressing  Goal: Pace activities to prevent fatigue by end of the shift  Outcome: Progressing     Problem: Pain  Goal: My pain/discomfort is manageable  Outcome: Progressing     Problem: Daily Care  Goal: Daily care needs are met  Outcome: Progressing     Problem: Psychosocial Needs  Goal: Demonstrates ability to cope with hospitalization/illness  Outcome: Progressing  Goal: Collaborate with me, my family, and caregiver to identify my specific goals  Outcome: Progressing     Problem: Discharge Barriers  Goal: My discharge needs are met  Outcome: Progressing     Problem: Skin  Goal: Decreased wound size/increased tissue granulation at next dressing change  Outcome: Progressing  Goal: Participates in plan/prevention/treatment measures  Outcome: Progressing  Goal: Prevent/manage excess moisture  Outcome: Progressing  Goal: Prevent/minimize sheer/friction injuries  Outcome: Progressing  Goal: Promote/optimize nutrition  Outcome: Progressing  Goal: Promote skin healing  Outcome: Progressing     Problem: Pain - Adult  Goal: Verbalizes/displays adequate comfort level or baseline comfort level  Outcome: Progressing     Problem: Discharge Planning  Goal: Discharge to home or other facility with appropriate resources  Outcome: Progressing     Problem: Chronic Conditions and Co-morbidities  Goal: Patient's chronic conditions and co-morbidity symptoms are monitored and maintained or improved  Outcome: Progressing   The patient's goals for the shift include To decrease pain    The clinical goals for the shift include Patient will have decreased agitation throughout shift

## 2023-11-17 NOTE — PROGRESS NOTES
PCN sent updated PT note for precert. PCN escalated precert. Transport in [facility].    DEYANIRA Melchor

## 2023-12-27 NOTE — NURSING NOTE
Notified Akanksha Lea CNP around 1930 that patient bp was 205/114. N.O give hydrochlorithiazide also gave norvasc x1. Will notify oncoming nurse.   
REPORT GIVEN TO PABLO LEUNG AT 17:29   
No

## 2025-02-01 ENCOUNTER — CLINICAL SUPPORT (OUTPATIENT)
Dept: EMERGENCY MEDICINE | Facility: HOSPITAL | Age: 65
End: 2025-02-01
Payer: COMMERCIAL

## 2025-02-01 ENCOUNTER — APPOINTMENT (OUTPATIENT)
Dept: RADIOLOGY | Facility: HOSPITAL | Age: 65
End: 2025-02-01
Payer: COMMERCIAL

## 2025-02-01 ENCOUNTER — HOSPITAL ENCOUNTER (EMERGENCY)
Facility: HOSPITAL | Age: 65
Discharge: HOME | End: 2025-02-02
Attending: EMERGENCY MEDICINE
Payer: COMMERCIAL

## 2025-02-01 DIAGNOSIS — R11.2 NAUSEA AND VOMITING, UNSPECIFIED VOMITING TYPE: Primary | ICD-10-CM

## 2025-02-01 LAB
ALBUMIN SERPL BCP-MCNC: 4.3 G/DL (ref 3.4–5)
ALP SERPL-CCNC: 65 U/L (ref 33–136)
ALT SERPL W P-5'-P-CCNC: 8 U/L (ref 7–45)
ANION GAP SERPL CALC-SCNC: 19 MMOL/L (ref 10–20)
AST SERPL W P-5'-P-CCNC: 20 U/L (ref 9–39)
BASOPHILS # BLD AUTO: 0.01 X10*3/UL (ref 0–0.1)
BASOPHILS NFR BLD AUTO: 0.1 %
BILIRUB SERPL-MCNC: 0.9 MG/DL (ref 0–1.2)
BUN SERPL-MCNC: 35 MG/DL (ref 6–23)
CALCIUM SERPL-MCNC: 10 MG/DL (ref 8.6–10.6)
CARDIAC TROPONIN I PNL SERPL HS: 14 NG/L (ref 0–34)
CHLORIDE SERPL-SCNC: 95 MMOL/L (ref 98–107)
CO2 SERPL-SCNC: 22 MMOL/L (ref 21–32)
CREAT SERPL-MCNC: 1.24 MG/DL (ref 0.5–1.05)
EGFRCR SERPLBLD CKD-EPI 2021: 48 ML/MIN/1.73M*2
EOSINOPHIL # BLD AUTO: 0.08 X10*3/UL (ref 0–0.7)
EOSINOPHIL NFR BLD AUTO: 1.2 %
ERYTHROCYTE [DISTWIDTH] IN BLOOD BY AUTOMATED COUNT: 13.5 % (ref 11.5–14.5)
FLUAV RNA RESP QL NAA+PROBE: NOT DETECTED
FLUBV RNA RESP QL NAA+PROBE: NOT DETECTED
GLUCOSE SERPL-MCNC: 76 MG/DL (ref 74–99)
HCT VFR BLD AUTO: 37.6 % (ref 36–46)
HGB BLD-MCNC: 12.5 G/DL (ref 12–16)
IMM GRANULOCYTES # BLD AUTO: 0.03 X10*3/UL (ref 0–0.7)
IMM GRANULOCYTES NFR BLD AUTO: 0.4 % (ref 0–0.9)
LIPASE SERPL-CCNC: 23 U/L (ref 9–82)
LYMPHOCYTES # BLD AUTO: 0.29 X10*3/UL (ref 1.2–4.8)
LYMPHOCYTES NFR BLD AUTO: 4.3 %
MAGNESIUM SERPL-MCNC: 1.76 MG/DL (ref 1.6–2.4)
MCH RBC QN AUTO: 26.4 PG (ref 26–34)
MCHC RBC AUTO-ENTMCNC: 33.2 G/DL (ref 32–36)
MCV RBC AUTO: 79 FL (ref 80–100)
MONOCYTES # BLD AUTO: 0.24 X10*3/UL (ref 0.1–1)
MONOCYTES NFR BLD AUTO: 3.6 %
NEUTROPHILS # BLD AUTO: 6.09 X10*3/UL (ref 1.2–7.7)
NEUTROPHILS NFR BLD AUTO: 90.4 %
NRBC BLD-RTO: 0 /100 WBCS (ref 0–0)
PLATELET # BLD AUTO: 225 X10*3/UL (ref 150–450)
POTASSIUM SERPL-SCNC: 3.6 MMOL/L (ref 3.5–5.3)
PROT SERPL-MCNC: 8.9 G/DL (ref 6.4–8.2)
RBC # BLD AUTO: 4.74 X10*6/UL (ref 4–5.2)
SARS-COV-2 RNA RESP QL NAA+PROBE: NOT DETECTED
SODIUM SERPL-SCNC: 132 MMOL/L (ref 136–145)
WBC # BLD AUTO: 6.7 X10*3/UL (ref 4.4–11.3)

## 2025-02-01 PROCEDURE — 73030 X-RAY EXAM OF SHOULDER: CPT | Mod: RIGHT SIDE | Performed by: RADIOLOGY

## 2025-02-01 PROCEDURE — 87636 SARSCOV2 & INF A&B AMP PRB: CPT | Performed by: EMERGENCY MEDICINE

## 2025-02-01 PROCEDURE — 36415 COLL VENOUS BLD VENIPUNCTURE: CPT | Performed by: EMERGENCY MEDICINE

## 2025-02-01 PROCEDURE — 99285 EMERGENCY DEPT VISIT HI MDM: CPT | Mod: 25 | Performed by: EMERGENCY MEDICINE

## 2025-02-01 PROCEDURE — 80053 COMPREHEN METABOLIC PANEL: CPT | Performed by: EMERGENCY MEDICINE

## 2025-02-01 PROCEDURE — 93010 ELECTROCARDIOGRAM REPORT: CPT | Performed by: EMERGENCY MEDICINE

## 2025-02-01 PROCEDURE — 99285 EMERGENCY DEPT VISIT HI MDM: CPT | Performed by: EMERGENCY MEDICINE

## 2025-02-01 PROCEDURE — 83735 ASSAY OF MAGNESIUM: CPT | Performed by: EMERGENCY MEDICINE

## 2025-02-01 PROCEDURE — 73030 X-RAY EXAM OF SHOULDER: CPT | Mod: RT

## 2025-02-01 PROCEDURE — 71046 X-RAY EXAM CHEST 2 VIEWS: CPT

## 2025-02-01 PROCEDURE — 84484 ASSAY OF TROPONIN QUANT: CPT | Performed by: EMERGENCY MEDICINE

## 2025-02-01 PROCEDURE — 71046 X-RAY EXAM CHEST 2 VIEWS: CPT | Performed by: RADIOLOGY

## 2025-02-01 PROCEDURE — 83690 ASSAY OF LIPASE: CPT | Performed by: EMERGENCY MEDICINE

## 2025-02-01 PROCEDURE — 2500000004 HC RX 250 GENERAL PHARMACY W/ HCPCS (ALT 636 FOR OP/ED): Mod: SE

## 2025-02-01 PROCEDURE — 85025 COMPLETE CBC W/AUTO DIFF WBC: CPT | Performed by: EMERGENCY MEDICINE

## 2025-02-01 PROCEDURE — 93005 ELECTROCARDIOGRAM TRACING: CPT

## 2025-02-01 RX ORDER — ONDANSETRON 4 MG/1
4 TABLET, ORALLY DISINTEGRATING ORAL ONCE
Status: COMPLETED | OUTPATIENT
Start: 2025-02-01 | End: 2025-02-01

## 2025-02-01 RX ADMIN — ONDANSETRON 4 MG: 4 TABLET, ORALLY DISINTEGRATING ORAL at 17:48

## 2025-02-01 SDOH — HEALTH STABILITY: MENTAL HEALTH: BEHAVIORS/MOOD: PLEASANT;APPEARS INTOXICATED

## 2025-02-01 SDOH — HEALTH STABILITY: MENTAL HEALTH: BEHAVIORAL HEALTH(WDL): EXCEPTIONS TO WDL

## 2025-02-01 ASSESSMENT — COLUMBIA-SUICIDE SEVERITY RATING SCALE - C-SSRS
6. HAVE YOU EVER DONE ANYTHING, STARTED TO DO ANYTHING, OR PREPARED TO DO ANYTHING TO END YOUR LIFE?: NO
1. IN THE PAST MONTH, HAVE YOU WISHED YOU WERE DEAD OR WISHED YOU COULD GO TO SLEEP AND NOT WAKE UP?: NO
2. HAVE YOU ACTUALLY HAD ANY THOUGHTS OF KILLING YOURSELF?: NO

## 2025-02-01 ASSESSMENT — LIFESTYLE VARIABLES
EVER FELT BAD OR GUILTY ABOUT YOUR DRINKING: NO
HAVE YOU EVER FELT YOU SHOULD CUT DOWN ON YOUR DRINKING: NO
TOTAL SCORE: 0
EVER HAD A DRINK FIRST THING IN THE MORNING TO STEADY YOUR NERVES TO GET RID OF A HANGOVER: NO
HAVE PEOPLE ANNOYED YOU BY CRITICIZING YOUR DRINKING: NO

## 2025-02-01 ASSESSMENT — PAIN SCALES - GENERAL
PAINLEVEL_OUTOF10: 0 - NO PAIN
PAINLEVEL_OUTOF10: 0 - NO PAIN

## 2025-02-01 ASSESSMENT — PAIN - FUNCTIONAL ASSESSMENT: PAIN_FUNCTIONAL_ASSESSMENT: 0-10

## 2025-02-01 ASSESSMENT — PAIN DESCRIPTION - PROGRESSION: CLINICAL_PROGRESSION: NOT CHANGED

## 2025-02-01 NOTE — ED NOTES
Pt states that [birth year] birthday and that she was drinking and smoking crack at her sisters     Raymundo Dela Cruz, JASMIN  02/01/25 4230

## 2025-02-01 NOTE — ED PROVIDER NOTES
Emergency Department Provider Note        History of Present Illness     History provided by: Patient  Limitations to History: Intoxication  External Records Reviewed with Brief Summary: None    HPI:  Sonido Pickering is a 65 y.o. female with PMH hypertension, seizures, chronic back pain, crack cocaine abuse presenting with nausea and vomiting that started today.  Patient reports she had 1 episode of emesis today and then proceeded to vomit again while in the ED. Patient denies any trauma, falls or injures. No passing out. She denies any headache, dizziness, vision changes, neck pain, back pain, fever, chills, chest pain, shortness of breath, abdominal pain, diarrhea, urinary symptoms, numbness, tingling, lower extremity pain or swelling, weakness, or sensory loss.  Patient reported that she did smoke crack and alcohol for her birthday today. She also reports chronic right shoulder pain for more than 1.5 years. She denies any sick contacts or recent travels. She denies any suicidal or homicidal ideation. She denies any hallucinations.     Physical Exam   Triage vitals:  T 35.4 °C (95.7 °F)  HR 68  BP (!) 172/96  RR 18  O2 97 % None (Room air)    General: Awake, alert, oriented, in no acute distress  Eyes: Gaze conjugate.  No scleral icterus or injection. PERRL, EOMI.   HENT: Normo-cephalic, atraumatic. No stridor. Mucous membranes moist.   Neck: supple, full ROM intact, no midline tenderness, no step-offs or deformities, no meningeal signs.   CV: Regular rate, regular rhythm. Radial pulses 2+ bilaterally  Resp: Breathing non-labored, speaking in full sentences.  Clear to auscultation bilaterally  GI: Soft, non-distended, non-tender. No rebound or guarding. No peritoneal signs. Normal bowel sounds.   MSK/Extremities: No gross bony deformities. Moving all extremities with good tone and ROM intact.  Skin: Warm. Appropriate color. Intact.  Neuro: Alert. Oriented. Face symmetric. Speech is fluent. Cranial nerves  2-12 intact. Gross 5/5 strength and sensation intact in b/l UE and LEs  Psych: Appropriate mood and affect    Medical Decision Making & ED Course   Medical Decision Makin y.o. female presenting to the ED with nausea and vomiting. Differential diagnosis is broad and includes but not limited to intoxication, pancreatitis, viral process, ACS, bowel obstruction, intra-abdominal process, gastroenteritis, food poisoning, infection, or electrolyte abnormality. In the Emergency Department, hospital records were reviewed. The patient is afebrile with stable vital signs. The patient is in no respiratory distress, satting well on room air. Attempts at IV placement were made by ED nurse but IV was unable to be obtained and the patient declined any further attempts at IV placement. Labs ordered: basic labs, lipase, troponin, Covid/flu. Imaging ordered: CT abd/pelvis, CXR, XR right shoulder. CXR was independently reviewed and showed no acute processes. Xrays of the right shoulder showed advanced glenohumeral joint osteoarthritis but no acute processes. Treatments including zofran  ODT were administered. Patient was able to tolerate oral intake afterwards and was able to eat and drink fluids. Labs reviewed and notable for slightly low sodium of 132 and slightly elevated creatinine of 1.24. Patient continued to decline IV placement, but she was given oral hydration. Magnesium level is normal. CBC shows no leukocytosis. Hemoglobin is normal. Troponin is negative Lipase is normal. Viral swabs are negative. Patient was signed out to oncoming ED team pending urinalysis and CT abdomen pelvis. Patient is pending remainder of workup and pending remainder of ED course and final disposition. She remains stable, resting comfortably at this time.     ----    Social Determinants of Health which Significantly Impact Care: None identified     EKG Independent Interpretation: The EKG obtained at 1930 was independently interpreted by myself.  It demonstrates sinus rhythm with a ventricular rate of 71. normal axis. Intervals all within normal limits. ST segments showed no evidence of ischemia.  Similar compared to prior EKG    Independent Result Review and Interpretation: Relevant laboratory and radiographic results were reviewed and independently interpreted by myself.  As necessary, they are commented on in the ED Course.    Chronic conditions affecting the patient's care: As documented above in [facility]    The patient was discussed with the following consultants/services: None    Care Considerations: As documented above in [facility]    ED Course:  Diagnoses as of 02/02/25 2238   Nausea and vomiting, unspecified vomiting type     Disposition   Patient was signed out to Dr. Gallegos at 11PM pending completion of their work-up.  Please see the next provider's transition of care note for the remainder of the patient's care.     Procedures   Procedures    Patient seen and discussed with ED attending physician.    Melanie Bee MD  Emergency Medicine     Melanie Bee MD  Resident  02/02/25 6521    I saw and evaluated the patient. I personally obtained the key and critical portions of the history and physical exam or was physically present for key and critical portions performed by the resident/fellow. I reviewed the resident/fellow's documentation and discussed the patient with the resident/fellow. I agree with the resident/fellow's medical decision making as documented in the note.    MD Irais Villarreal MD  02/03/25 2919

## 2025-02-02 ENCOUNTER — APPOINTMENT (OUTPATIENT)
Dept: RADIOLOGY | Facility: HOSPITAL | Age: 65
End: 2025-02-02
Payer: COMMERCIAL

## 2025-02-02 VITALS
RESPIRATION RATE: 16 BRPM | SYSTOLIC BLOOD PRESSURE: 139 MMHG | OXYGEN SATURATION: 98 % | BODY MASS INDEX: 20.55 KG/M2 | DIASTOLIC BLOOD PRESSURE: 79 MMHG | HEART RATE: 69 BPM | HEIGHT: 63 IN | TEMPERATURE: 95.7 F | WEIGHT: 115.96 LBS

## 2025-02-02 LAB
ATRIAL RATE: 68 BPM
P AXIS: 28 DEGREES
P OFFSET: 203 MS
P ONSET: 140 MS
PR INTERVAL: 162 MS
Q ONSET: 221 MS
QRS COUNT: 11 BEATS
QRS DURATION: 80 MS
QT INTERVAL: 482 MS
QTC CALCULATION(BAZETT): 512 MS
QTC FREDERICIA: 502 MS
R AXIS: 27 DEGREES
T AXIS: 30 DEGREES
T OFFSET: 462 MS
VENTRICULAR RATE: 68 BPM

## 2025-02-02 PROCEDURE — 74176 CT ABD & PELVIS W/O CONTRAST: CPT

## 2025-02-02 PROCEDURE — 74176 CT ABD & PELVIS W/O CONTRAST: CPT | Performed by: SURGERY

## 2025-02-02 RX ORDER — ONDANSETRON 4 MG/1
4 TABLET, ORALLY DISINTEGRATING ORAL EVERY 8 HOURS PRN
Qty: 30 TABLET | Refills: 0 | Status: SHIPPED | OUTPATIENT
Start: 2025-02-02

## 2025-02-02 NOTE — DISCHARGE INSTRUCTIONS
You were seen today for nausea vomiting.  We got imaging of your belly, this does not demonstrate any significant abnormalities.  Your nausea and vomiting may be in the setting of alcohol use, or may be in the setting of crack cocaine use.  I would avoid doing both of these, as will likely increase her risk of complications given your age.  If these symptoms worsen, would recommend following with primary care doctor, or you could return here for further treatment evaluation.  I sent Zofran to [facility], our pharmacy here at the hospital.  You can call them and have the medication transferred if he would like to fill at a different hospital.

## 2025-02-03 PROCEDURE — RXMED WILLOW AMBULATORY MEDICATION CHARGE

## 2025-02-04 ENCOUNTER — PHARMACY VISIT (OUTPATIENT)
Dept: PHARMACY | Facility: CLINIC | Age: 65
End: 2025-02-04
Payer: MEDICAID